# Patient Record
Sex: FEMALE | Race: WHITE | Employment: OTHER | URBAN - METROPOLITAN AREA
[De-identification: names, ages, dates, MRNs, and addresses within clinical notes are randomized per-mention and may not be internally consistent; named-entity substitution may affect disease eponyms.]

---

## 2017-12-14 ENCOUNTER — OFFICE VISIT (OUTPATIENT)
Dept: INTERNAL MEDICINE CLINIC | Age: 77
End: 2017-12-14
Payer: COMMERCIAL

## 2017-12-14 VITALS
RESPIRATION RATE: 21 BRPM | HEART RATE: 82 BPM | BODY MASS INDEX: 34.59 KG/M2 | OXYGEN SATURATION: 96 % | HEIGHT: 64 IN | DIASTOLIC BLOOD PRESSURE: 80 MMHG | WEIGHT: 202.6 LBS | SYSTOLIC BLOOD PRESSURE: 122 MMHG

## 2017-12-14 DIAGNOSIS — K21.9 GASTROESOPHAGEAL REFLUX DISEASE WITHOUT ESOPHAGITIS: ICD-10-CM

## 2017-12-14 DIAGNOSIS — M41.20 SCOLIOSIS (AND KYPHOSCOLIOSIS), IDIOPATHIC: ICD-10-CM

## 2017-12-14 DIAGNOSIS — M15.9 GENERALIZED OA: ICD-10-CM

## 2017-12-14 DIAGNOSIS — Z78.0 POST-MENOPAUSAL: ICD-10-CM

## 2017-12-14 DIAGNOSIS — R04.0 EPISTAXIS: ICD-10-CM

## 2017-12-14 DIAGNOSIS — I48.20 CHRONIC ATRIAL FIBRILLATION (HCC): ICD-10-CM

## 2017-12-14 DIAGNOSIS — I25.10 CORONARY ARTERY DISEASE INVOLVING NATIVE CORONARY ARTERY OF NATIVE HEART WITHOUT ANGINA PECTORIS: ICD-10-CM

## 2017-12-14 DIAGNOSIS — I10 ESSENTIAL HYPERTENSION: Primary | ICD-10-CM

## 2017-12-14 DIAGNOSIS — M85.80 OSTEOPENIA, UNSPECIFIED LOCATION: ICD-10-CM

## 2017-12-14 DIAGNOSIS — E78.49 OTHER HYPERLIPIDEMIA: ICD-10-CM

## 2017-12-14 DIAGNOSIS — D68.9 COAGULOPATHY (HCC): ICD-10-CM

## 2017-12-14 PROCEDURE — 99204 OFFICE O/P NEW MOD 45 MIN: CPT | Performed by: FAMILY MEDICINE

## 2017-12-14 PROCEDURE — 1036F TOBACCO NON-USER: CPT | Performed by: FAMILY MEDICINE

## 2017-12-14 PROCEDURE — G8427 DOCREV CUR MEDS BY ELIG CLIN: HCPCS | Performed by: FAMILY MEDICINE

## 2017-12-14 PROCEDURE — 1123F ACP DISCUSS/DSCN MKR DOCD: CPT | Performed by: FAMILY MEDICINE

## 2017-12-14 PROCEDURE — G8400 PT W/DXA NO RESULTS DOC: HCPCS | Performed by: FAMILY MEDICINE

## 2017-12-14 PROCEDURE — G8417 CALC BMI ABV UP PARAM F/U: HCPCS | Performed by: FAMILY MEDICINE

## 2017-12-14 PROCEDURE — 4040F PNEUMOC VAC/ADMIN/RCVD: CPT | Performed by: FAMILY MEDICINE

## 2017-12-14 PROCEDURE — 1090F PRES/ABSN URINE INCON ASSESS: CPT | Performed by: FAMILY MEDICINE

## 2017-12-14 PROCEDURE — G8484 FLU IMMUNIZE NO ADMIN: HCPCS | Performed by: FAMILY MEDICINE

## 2017-12-14 PROCEDURE — G8598 ASA/ANTIPLAT THER USED: HCPCS | Performed by: FAMILY MEDICINE

## 2017-12-14 RX ORDER — APIXABAN 2.5 MG/1
TABLET, FILM COATED ORAL
COMMUNITY
Start: 2017-09-22 | End: 2018-03-05 | Stop reason: SDUPTHER

## 2017-12-14 RX ORDER — OMEPRAZOLE 40 MG/1
CAPSULE, DELAYED RELEASE ORAL
COMMUNITY
Start: 2017-11-01 | End: 2018-01-26 | Stop reason: ALTCHOICE

## 2017-12-14 RX ORDER — INDAPAMIDE 1.25 MG/1
TABLET, FILM COATED ORAL
COMMUNITY
Start: 2017-12-08

## 2017-12-14 ASSESSMENT — ENCOUNTER SYMPTOMS
GASTROINTESTINAL NEGATIVE: 1
RESPIRATORY NEGATIVE: 1
ALLERGIC/IMMUNOLOGIC NEGATIVE: 1
EYES NEGATIVE: 1

## 2017-12-14 ASSESSMENT — PATIENT HEALTH QUESTIONNAIRE - PHQ9
SUM OF ALL RESPONSES TO PHQ9 QUESTIONS 1 & 2: 0
1. LITTLE INTEREST OR PLEASURE IN DOING THINGS: 0
2. FEELING DOWN, DEPRESSED OR HOPELESS: 0
SUM OF ALL RESPONSES TO PHQ QUESTIONS 1-9: 0

## 2017-12-14 NOTE — PROGRESS NOTES
stable on proton pump inhibitor. Kyphoscoliosis. No apparent acute cardiopulmonary decompensation. Continue activity as tolerated. Osteopenia on calcium and vitamin D.  Med list reviewed advised to continue  Patient appears anxious with clinical possibility of underlying depression. She denies suicidality. She denies tobacco, excessive alcohol or illicit drug use  Patient state that she recently had some blood work done at [facility]. Advised to furnish us with a copy. She denies tobacco, excessive alcohol or illicit drug use  Safety counseling including but not limited to carbon monoxide/fire alarm, ample lighting, avoiding loose clutter and staying in familiar environment. She is advised to update her pneumonia and influenza vaccination. Further recommendations to follow labs  Call for any concern  This note is created with a voice recognition program and while intend to generate a document that accurately reflects the content of the visit, no guarantee can be provided that every mistake has been identified and corrected by editing.

## 2017-12-28 ENCOUNTER — HOSPITAL ENCOUNTER (OUTPATIENT)
Facility: CLINIC | Age: 77
Discharge: HOME | End: 2017-12-28
Payer: COMMERCIAL

## 2017-12-28 DIAGNOSIS — I10 ESSENTIAL HYPERTENSION: ICD-10-CM

## 2017-12-28 DIAGNOSIS — I48.20 CHRONIC ATRIAL FIBRILLATION (HCC): ICD-10-CM

## 2017-12-28 DIAGNOSIS — D68.9 COAGULOPATHY (HCC): ICD-10-CM

## 2017-12-28 LAB
ABSOLUTE EOS #: 0.11 K/UL (ref 0–0.44)
ABSOLUTE IMMATURE GRANULOCYTE: <0.03 K/UL (ref 0–0.3)
ABSOLUTE LYMPH #: 1.64 K/UL (ref 1.1–3.7)
ABSOLUTE MONO #: 0.36 K/UL (ref 0.1–1.2)
ALBUMIN SERPL-MCNC: 3.8 G/DL (ref 3.5–5.2)
ALBUMIN/GLOBULIN RATIO: 1 (ref 1–2.5)
ALP BLD-CCNC: 79 U/L (ref 35–104)
ALT SERPL-CCNC: 22 U/L (ref 5–33)
ANION GAP SERPL CALCULATED.3IONS-SCNC: 10 MMOL/L (ref 9–17)
AST SERPL-CCNC: 26 U/L
BASOPHILS # BLD: 0 % (ref 0–2)
BASOPHILS ABSOLUTE: <0.03 K/UL (ref 0–0.2)
BILIRUB SERPL-MCNC: 0.67 MG/DL (ref 0.3–1.2)
BUN BLDV-MCNC: 19 MG/DL (ref 8–23)
BUN/CREAT BLD: ABNORMAL (ref 9–20)
CALCIUM SERPL-MCNC: 9.6 MG/DL (ref 8.6–10.4)
CHLORIDE BLD-SCNC: 98 MMOL/L (ref 98–107)
CHOLESTEROL/HDL RATIO: 3.8
CHOLESTEROL: 234 MG/DL
CO2: 28 MMOL/L (ref 20–31)
CREAT SERPL-MCNC: 0.76 MG/DL (ref 0.5–0.9)
DIFFERENTIAL TYPE: ABNORMAL
EOSINOPHILS RELATIVE PERCENT: 2 % (ref 1–4)
ESTIMATED AVERAGE GLUCOSE: 131 MG/DL
GFR AFRICAN AMERICAN: >60 ML/MIN
GFR NON-AFRICAN AMERICAN: >60 ML/MIN
GFR SERPL CREATININE-BSD FRML MDRD: ABNORMAL ML/MIN/{1.73_M2}
GFR SERPL CREATININE-BSD FRML MDRD: ABNORMAL ML/MIN/{1.73_M2}
GLUCOSE BLD-MCNC: 117 MG/DL (ref 70–99)
HBA1C MFR BLD: 6.2 % (ref 4–6)
HCT VFR BLD CALC: 51.2 % (ref 36.3–47.1)
HDLC SERPL-MCNC: 61 MG/DL
HEMOGLOBIN: 16.2 G/DL (ref 11.9–15.1)
HEPATITIS C ANTIBODY: ABNORMAL
HIV AG/AB: NONREACTIVE
IMMATURE GRANULOCYTES: 0 %
LDL CHOLESTEROL: 128 MG/DL (ref 0–130)
LYMPHOCYTES # BLD: 36 % (ref 24–43)
MCH RBC QN AUTO: 30 PG (ref 25.2–33.5)
MCHC RBC AUTO-ENTMCNC: 31.6 G/DL (ref 28.4–34.8)
MCV RBC AUTO: 94.8 FL (ref 82.6–102.9)
MONOCYTES # BLD: 8 % (ref 3–12)
PDW BLD-RTO: 14.6 % (ref 11.8–14.4)
PLATELET # BLD: 208 K/UL (ref 138–453)
PLATELET ESTIMATE: ABNORMAL
PMV BLD AUTO: 10.4 FL (ref 8.1–13.5)
POTASSIUM SERPL-SCNC: 4.6 MMOL/L (ref 3.7–5.3)
RBC # BLD: 5.4 M/UL (ref 3.95–5.11)
RBC # BLD: ABNORMAL 10*6/UL
SEG NEUTROPHILS: 54 % (ref 36–65)
SEGMENTED NEUTROPHILS ABSOLUTE COUNT: 2.38 K/UL (ref 1.5–8.1)
SODIUM BLD-SCNC: 136 MMOL/L (ref 135–144)
TOTAL PROTEIN: 7.6 G/DL (ref 6.4–8.3)
TRIGL SERPL-MCNC: 225 MG/DL
TSH SERPL DL<=0.05 MIU/L-ACNC: 2.17 MIU/L (ref 0.3–5)
VLDLC SERPL CALC-MCNC: ABNORMAL MG/DL (ref 1–30)
WBC # BLD: 4.5 K/UL (ref 3.5–11.3)
WBC # BLD: ABNORMAL 10*3/UL

## 2017-12-28 PROCEDURE — 36415 COLL VENOUS BLD VENIPUNCTURE: CPT

## 2017-12-28 PROCEDURE — 80061 LIPID PANEL: CPT

## 2017-12-28 PROCEDURE — 87389 HIV-1 AG W/HIV-1&-2 AB AG IA: CPT

## 2017-12-28 PROCEDURE — 80053 COMPREHEN METABOLIC PANEL: CPT

## 2017-12-28 PROCEDURE — 86803 HEPATITIS C AB TEST: CPT

## 2017-12-28 PROCEDURE — 85025 COMPLETE CBC W/AUTO DIFF WBC: CPT

## 2017-12-28 PROCEDURE — 84443 ASSAY THYROID STIM HORMONE: CPT

## 2017-12-28 PROCEDURE — 83036 HEMOGLOBIN GLYCOSYLATED A1C: CPT

## 2018-01-22 RX ORDER — AMLODIPINE BESYLATE 5 MG/1
TABLET ORAL
Qty: 90 TABLET | Refills: 1 | Status: SHIPPED | OUTPATIENT
Start: 2018-01-22 | End: 2018-07-27 | Stop reason: SDUPTHER

## 2018-02-05 ENCOUNTER — TELEPHONE (OUTPATIENT)
Dept: INTERNAL MEDICINE CLINIC | Age: 78
End: 2018-02-05

## 2018-02-05 RX ORDER — OMEPRAZOLE 10 MG/1
10 CAPSULE, DELAYED RELEASE ORAL DAILY
Qty: 90 CAPSULE | Refills: 2 | Status: SHIPPED | OUTPATIENT
Start: 2018-02-05 | End: 2018-02-05 | Stop reason: DRUGHIGH

## 2018-02-05 RX ORDER — OMEPRAZOLE 40 MG/1
40 CAPSULE, DELAYED RELEASE ORAL DAILY
Qty: 90 CAPSULE | Refills: 3 | Status: SHIPPED | OUTPATIENT
Start: 2018-02-05 | End: 2018-02-06 | Stop reason: SDUPTHER

## 2018-02-06 RX ORDER — OMEPRAZOLE 40 MG/1
40 CAPSULE, DELAYED RELEASE ORAL DAILY
Qty: 90 CAPSULE | Refills: 3 | Status: SHIPPED | OUTPATIENT
Start: 2018-02-06

## 2018-03-05 RX ORDER — APIXABAN 2.5 MG/1
TABLET, FILM COATED ORAL
Qty: 60 TABLET | Refills: 0 | Status: SHIPPED | OUTPATIENT
Start: 2018-03-05 | End: 2018-04-05 | Stop reason: SDUPTHER

## 2018-04-05 RX ORDER — APIXABAN 2.5 MG/1
TABLET, FILM COATED ORAL
Qty: 60 TABLET | Refills: 1 | Status: SHIPPED | OUTPATIENT
Start: 2018-04-05 | End: 2018-06-04 | Stop reason: SDUPTHER

## 2018-04-19 ENCOUNTER — OFFICE VISIT (OUTPATIENT)
Dept: INTERNAL MEDICINE CLINIC | Age: 78
End: 2018-04-19
Payer: MEDICARE

## 2018-04-19 VITALS
SYSTOLIC BLOOD PRESSURE: 110 MMHG | HEART RATE: 78 BPM | HEIGHT: 64 IN | DIASTOLIC BLOOD PRESSURE: 80 MMHG | RESPIRATION RATE: 17 BRPM | OXYGEN SATURATION: 97 % | WEIGHT: 207 LBS | BODY MASS INDEX: 35.34 KG/M2

## 2018-04-19 DIAGNOSIS — R73.03 PRE-DIABETES: ICD-10-CM

## 2018-04-19 DIAGNOSIS — E78.49 OTHER HYPERLIPIDEMIA: ICD-10-CM

## 2018-04-19 DIAGNOSIS — I10 ESSENTIAL HYPERTENSION: Primary | ICD-10-CM

## 2018-04-19 DIAGNOSIS — I48.20 CHRONIC ATRIAL FIBRILLATION (HCC): ICD-10-CM

## 2018-04-19 DIAGNOSIS — D68.9 COAGULOPATHY (HCC): ICD-10-CM

## 2018-04-19 DIAGNOSIS — G47.30 SLEEP APNEA, UNSPECIFIED TYPE: ICD-10-CM

## 2018-04-19 DIAGNOSIS — D75.1 SECONDARY POLYCYTHEMIA: ICD-10-CM

## 2018-04-19 DIAGNOSIS — I25.10 CORONARY ARTERY DISEASE INVOLVING NATIVE CORONARY ARTERY OF NATIVE HEART WITHOUT ANGINA PECTORIS: ICD-10-CM

## 2018-04-19 DIAGNOSIS — E88.81 METABOLIC SYNDROME: ICD-10-CM

## 2018-04-19 PROCEDURE — G8417 CALC BMI ABV UP PARAM F/U: HCPCS | Performed by: FAMILY MEDICINE

## 2018-04-19 PROCEDURE — 4040F PNEUMOC VAC/ADMIN/RCVD: CPT | Performed by: FAMILY MEDICINE

## 2018-04-19 PROCEDURE — 1036F TOBACCO NON-USER: CPT | Performed by: FAMILY MEDICINE

## 2018-04-19 PROCEDURE — 1123F ACP DISCUSS/DSCN MKR DOCD: CPT | Performed by: FAMILY MEDICINE

## 2018-04-19 PROCEDURE — 99214 OFFICE O/P EST MOD 30 MIN: CPT | Performed by: FAMILY MEDICINE

## 2018-04-19 PROCEDURE — G8598 ASA/ANTIPLAT THER USED: HCPCS | Performed by: FAMILY MEDICINE

## 2018-04-19 PROCEDURE — 1090F PRES/ABSN URINE INCON ASSESS: CPT | Performed by: FAMILY MEDICINE

## 2018-04-19 PROCEDURE — G8400 PT W/DXA NO RESULTS DOC: HCPCS | Performed by: FAMILY MEDICINE

## 2018-04-19 PROCEDURE — G8427 DOCREV CUR MEDS BY ELIG CLIN: HCPCS | Performed by: FAMILY MEDICINE

## 2018-04-19 RX ORDER — DIAZEPAM 5 MG/1
TABLET ORAL
Refills: 0 | COMMUNITY
Start: 2018-03-12 | End: 2019-07-25 | Stop reason: ALTCHOICE

## 2018-04-19 RX ORDER — LISINOPRIL 40 MG/1
40 TABLET ORAL DAILY
Qty: 90 TABLET | Refills: 1 | Status: SHIPPED | OUTPATIENT
Start: 2018-04-19 | End: 2018-10-25 | Stop reason: SDUPTHER

## 2018-04-19 RX ORDER — ATORVASTATIN CALCIUM 10 MG/1
10 TABLET, FILM COATED ORAL DAILY
Qty: 90 TABLET | Refills: 1 | Status: SHIPPED | OUTPATIENT
Start: 2018-04-19

## 2018-04-19 ASSESSMENT — PATIENT HEALTH QUESTIONNAIRE - PHQ9
SUM OF ALL RESPONSES TO PHQ9 QUESTIONS 1 & 2: 0
SUM OF ALL RESPONSES TO PHQ QUESTIONS 1-9: 0
2. FEELING DOWN, DEPRESSED OR HOPELESS: 0
1. LITTLE INTEREST OR PLEASURE IN DOING THINGS: 0

## 2018-04-19 ASSESSMENT — ENCOUNTER SYMPTOMS
EYES NEGATIVE: 1
ALLERGIC/IMMUNOLOGIC NEGATIVE: 1
GASTROINTESTINAL NEGATIVE: 1
RESPIRATORY NEGATIVE: 1

## 2018-07-30 RX ORDER — AMLODIPINE BESYLATE 5 MG/1
TABLET ORAL
Qty: 90 TABLET | Refills: 0 | Status: SHIPPED | OUTPATIENT
Start: 2018-07-30 | End: 2018-10-30 | Stop reason: SDUPTHER

## 2018-10-25 RX ORDER — LISINOPRIL 40 MG/1
40 TABLET ORAL DAILY
Qty: 30 TABLET | Refills: 0 | Status: SHIPPED | OUTPATIENT
Start: 2018-10-25

## 2019-03-25 ENCOUNTER — OFFICE VISIT (OUTPATIENT)
Dept: INTERNAL MEDICINE CLINIC | Age: 79
End: 2019-03-25
Payer: MEDICARE

## 2019-03-25 ENCOUNTER — CARE COORDINATION (OUTPATIENT)
Dept: CARE COORDINATION | Age: 79
End: 2019-03-25

## 2019-03-25 VITALS
HEART RATE: 79 BPM | BODY MASS INDEX: 36.16 KG/M2 | WEIGHT: 211.8 LBS | DIASTOLIC BLOOD PRESSURE: 82 MMHG | SYSTOLIC BLOOD PRESSURE: 135 MMHG | HEIGHT: 64 IN

## 2019-03-25 DIAGNOSIS — M54.12 CERVICAL RADICULOPATHY: ICD-10-CM

## 2019-03-25 DIAGNOSIS — E78.49 OTHER HYPERLIPIDEMIA: ICD-10-CM

## 2019-03-25 DIAGNOSIS — I10 ESSENTIAL HYPERTENSION: Primary | ICD-10-CM

## 2019-03-25 DIAGNOSIS — Z59.89 INSURANCE COVERAGE PROBLEMS: ICD-10-CM

## 2019-03-25 DIAGNOSIS — M47.819 MULTILEVEL SPONDYLOSIS: ICD-10-CM

## 2019-03-25 DIAGNOSIS — I25.10 CORONARY ARTERY DISEASE INVOLVING NATIVE CORONARY ARTERY OF NATIVE HEART WITHOUT ANGINA PECTORIS: ICD-10-CM

## 2019-03-25 DIAGNOSIS — I48.20 CHRONIC ATRIAL FIBRILLATION (HCC): ICD-10-CM

## 2019-03-25 DIAGNOSIS — D68.9 COAGULOPATHY (HCC): ICD-10-CM

## 2019-03-25 DIAGNOSIS — R73.03 PRE-DIABETES: ICD-10-CM

## 2019-03-25 DIAGNOSIS — Z91.199 MEDICALLY NONCOMPLIANT: ICD-10-CM

## 2019-03-25 PROBLEM — Z59.71 INSURANCE COVERAGE PROBLEMS: Status: ACTIVE | Noted: 2019-03-25

## 2019-03-25 PROCEDURE — G8427 DOCREV CUR MEDS BY ELIG CLIN: HCPCS | Performed by: FAMILY MEDICINE

## 2019-03-25 PROCEDURE — 1101F PT FALLS ASSESS-DOCD LE1/YR: CPT | Performed by: FAMILY MEDICINE

## 2019-03-25 PROCEDURE — 1036F TOBACCO NON-USER: CPT | Performed by: FAMILY MEDICINE

## 2019-03-25 PROCEDURE — G8400 PT W/DXA NO RESULTS DOC: HCPCS | Performed by: FAMILY MEDICINE

## 2019-03-25 PROCEDURE — 1123F ACP DISCUSS/DSCN MKR DOCD: CPT | Performed by: FAMILY MEDICINE

## 2019-03-25 PROCEDURE — G8482 FLU IMMUNIZE ORDER/ADMIN: HCPCS | Performed by: FAMILY MEDICINE

## 2019-03-25 PROCEDURE — 4040F PNEUMOC VAC/ADMIN/RCVD: CPT | Performed by: FAMILY MEDICINE

## 2019-03-25 PROCEDURE — 1090F PRES/ABSN URINE INCON ASSESS: CPT | Performed by: FAMILY MEDICINE

## 2019-03-25 PROCEDURE — G8598 ASA/ANTIPLAT THER USED: HCPCS | Performed by: FAMILY MEDICINE

## 2019-03-25 PROCEDURE — G8417 CALC BMI ABV UP PARAM F/U: HCPCS | Performed by: FAMILY MEDICINE

## 2019-03-25 PROCEDURE — 99214 OFFICE O/P EST MOD 30 MIN: CPT | Performed by: FAMILY MEDICINE

## 2019-03-25 RX ORDER — TRAMADOL HYDROCHLORIDE 50 MG/1
TABLET ORAL
Refills: 0 | COMMUNITY
Start: 2019-02-14

## 2019-03-25 SDOH — ECONOMIC STABILITY - INCOME SECURITY: OTHER PROBLEMS RELATED TO HOUSING AND ECONOMIC CIRCUMSTANCES: Z59.89

## 2019-03-25 ASSESSMENT — PATIENT HEALTH QUESTIONNAIRE - PHQ9
SUM OF ALL RESPONSES TO PHQ QUESTIONS 1-9: 0
1. LITTLE INTEREST OR PLEASURE IN DOING THINGS: 0
SUM OF ALL RESPONSES TO PHQ QUESTIONS 1-9: 0
2. FEELING DOWN, DEPRESSED OR HOPELESS: 0
SUM OF ALL RESPONSES TO PHQ9 QUESTIONS 1 & 2: 0

## 2019-03-25 ASSESSMENT — ENCOUNTER SYMPTOMS
BACK PAIN: 1
RESPIRATORY NEGATIVE: 1
EYES NEGATIVE: 1
ALLERGIC/IMMUNOLOGIC NEGATIVE: 1
GASTROINTESTINAL NEGATIVE: 1

## 2019-04-01 ENCOUNTER — HOSPITAL ENCOUNTER (OUTPATIENT)
Facility: CLINIC | Age: 79
Discharge: HOME | End: 2019-04-01
Payer: MEDICARE

## 2019-04-01 DIAGNOSIS — R73.03 PRE-DIABETES: ICD-10-CM

## 2019-04-01 DIAGNOSIS — I10 ESSENTIAL HYPERTENSION: ICD-10-CM

## 2019-04-01 DIAGNOSIS — I25.10 CORONARY ARTERY DISEASE INVOLVING NATIVE CORONARY ARTERY OF NATIVE HEART WITHOUT ANGINA PECTORIS: ICD-10-CM

## 2019-04-01 LAB
ALBUMIN SERPL-MCNC: 4 G/DL (ref 3.5–5.2)
ALBUMIN/GLOBULIN RATIO: 1 (ref 1–2.5)
ALP BLD-CCNC: 76 U/L (ref 35–104)
ALT SERPL-CCNC: 20 U/L (ref 5–33)
ANION GAP SERPL CALCULATED.3IONS-SCNC: 18 MMOL/L (ref 9–17)
AST SERPL-CCNC: 23 U/L
BILIRUB SERPL-MCNC: 0.48 MG/DL (ref 0.3–1.2)
BUN BLDV-MCNC: 15 MG/DL (ref 8–23)
BUN/CREAT BLD: ABNORMAL (ref 9–20)
CALCIUM SERPL-MCNC: 9.8 MG/DL (ref 8.6–10.4)
CHLORIDE BLD-SCNC: 104 MMOL/L (ref 98–107)
CHOLESTEROL/HDL RATIO: 3.9
CHOLESTEROL: 177 MG/DL
CO2: 22 MMOL/L (ref 20–31)
CREAT SERPL-MCNC: 0.62 MG/DL (ref 0.5–0.9)
CREATININE URINE: 92.4 MG/DL (ref 28–217)
ESTIMATED AVERAGE GLUCOSE: 137 MG/DL
GFR AFRICAN AMERICAN: >60 ML/MIN
GFR NON-AFRICAN AMERICAN: >60 ML/MIN
GFR SERPL CREATININE-BSD FRML MDRD: ABNORMAL ML/MIN/{1.73_M2}
GFR SERPL CREATININE-BSD FRML MDRD: ABNORMAL ML/MIN/{1.73_M2}
GLUCOSE BLD-MCNC: 123 MG/DL (ref 70–99)
HBA1C MFR BLD: 6.4 % (ref 4–6)
HCT VFR BLD CALC: 48.9 % (ref 36.3–47.1)
HDLC SERPL-MCNC: 45 MG/DL
HEMOGLOBIN: 15.5 G/DL (ref 11.9–15.1)
LDL CHOLESTEROL: 90 MG/DL (ref 0–130)
MCH RBC QN AUTO: 28.3 PG (ref 25.2–33.5)
MCHC RBC AUTO-ENTMCNC: 31.7 G/DL (ref 28.4–34.8)
MCV RBC AUTO: 89.4 FL (ref 82.6–102.9)
MICROALBUMIN/CREAT 24H UR: 37 MG/L
MICROALBUMIN/CREAT UR-RTO: 40 MCG/MG CREAT
NRBC AUTOMATED: 0 PER 100 WBC
PDW BLD-RTO: 17.2 % (ref 11.8–14.4)
PLATELET # BLD: 208 K/UL (ref 138–453)
PMV BLD AUTO: 10.5 FL (ref 8.1–13.5)
POTASSIUM SERPL-SCNC: 4.1 MMOL/L (ref 3.7–5.3)
RBC # BLD: 5.47 M/UL (ref 3.95–5.11)
SODIUM BLD-SCNC: 144 MMOL/L (ref 135–144)
TOTAL PROTEIN: 7.9 G/DL (ref 6.4–8.3)
TRIGL SERPL-MCNC: 209 MG/DL
TSH SERPL DL<=0.05 MIU/L-ACNC: 2.08 MIU/L (ref 0.3–5)
VLDLC SERPL CALC-MCNC: ABNORMAL MG/DL (ref 1–30)
WBC # BLD: 5.2 K/UL (ref 3.5–11.3)

## 2019-04-01 PROCEDURE — 82570 ASSAY OF URINE CREATININE: CPT

## 2019-04-01 PROCEDURE — 85027 COMPLETE CBC AUTOMATED: CPT

## 2019-04-01 PROCEDURE — 80053 COMPREHEN METABOLIC PANEL: CPT

## 2019-04-01 PROCEDURE — 82043 UR ALBUMIN QUANTITATIVE: CPT

## 2019-04-01 PROCEDURE — 83036 HEMOGLOBIN GLYCOSYLATED A1C: CPT

## 2019-04-01 PROCEDURE — 36415 COLL VENOUS BLD VENIPUNCTURE: CPT

## 2019-04-01 PROCEDURE — 84443 ASSAY THYROID STIM HORMONE: CPT

## 2019-04-01 PROCEDURE — 80061 LIPID PANEL: CPT

## 2019-07-25 ENCOUNTER — OFFICE VISIT (OUTPATIENT)
Dept: INTERNAL MEDICINE CLINIC | Age: 79
End: 2019-07-25
Payer: MEDICARE

## 2019-07-25 VITALS
DIASTOLIC BLOOD PRESSURE: 80 MMHG | SYSTOLIC BLOOD PRESSURE: 130 MMHG | HEART RATE: 92 BPM | BODY MASS INDEX: 35.85 KG/M2 | WEIGHT: 210 LBS | RESPIRATION RATE: 24 BRPM | OXYGEN SATURATION: 94 % | HEIGHT: 64 IN

## 2019-07-25 DIAGNOSIS — I48.20 CHRONIC ATRIAL FIBRILLATION (HCC): ICD-10-CM

## 2019-07-25 DIAGNOSIS — R41.3 MEMORY DIFFICULTIES: Primary | ICD-10-CM

## 2019-07-25 DIAGNOSIS — I10 ESSENTIAL HYPERTENSION: ICD-10-CM

## 2019-07-25 DIAGNOSIS — T45.515A WARFARIN-INDUCED COAGULOPATHY (HCC): ICD-10-CM

## 2019-07-25 DIAGNOSIS — M54.12 CERVICAL RADICULOPATHY: ICD-10-CM

## 2019-07-25 DIAGNOSIS — M47.819 MULTILEVEL SPONDYLOSIS: ICD-10-CM

## 2019-07-25 DIAGNOSIS — E78.49 OTHER HYPERLIPIDEMIA: ICD-10-CM

## 2019-07-25 DIAGNOSIS — I25.10 CORONARY ARTERY DISEASE INVOLVING NATIVE CORONARY ARTERY OF NATIVE HEART WITHOUT ANGINA PECTORIS: ICD-10-CM

## 2019-07-25 DIAGNOSIS — R73.03 PRE-DIABETES: ICD-10-CM

## 2019-07-25 DIAGNOSIS — D68.32 WARFARIN-INDUCED COAGULOPATHY (HCC): ICD-10-CM

## 2019-07-25 PROBLEM — Z59.89 INSURANCE COVERAGE PROBLEMS: Status: RESOLVED | Noted: 2019-03-25 | Resolved: 2019-07-25

## 2019-07-25 PROBLEM — Z59.71 INSURANCE COVERAGE PROBLEMS: Status: RESOLVED | Noted: 2019-03-25 | Resolved: 2019-07-25

## 2019-07-25 PROBLEM — Z91.199 MEDICALLY NONCOMPLIANT: Status: RESOLVED | Noted: 2019-03-25 | Resolved: 2019-07-25

## 2019-07-25 PROCEDURE — G8417 CALC BMI ABV UP PARAM F/U: HCPCS | Performed by: FAMILY MEDICINE

## 2019-07-25 PROCEDURE — G8598 ASA/ANTIPLAT THER USED: HCPCS | Performed by: FAMILY MEDICINE

## 2019-07-25 PROCEDURE — G8427 DOCREV CUR MEDS BY ELIG CLIN: HCPCS | Performed by: FAMILY MEDICINE

## 2019-07-25 PROCEDURE — G8400 PT W/DXA NO RESULTS DOC: HCPCS | Performed by: FAMILY MEDICINE

## 2019-07-25 PROCEDURE — 1090F PRES/ABSN URINE INCON ASSESS: CPT | Performed by: FAMILY MEDICINE

## 2019-07-25 PROCEDURE — 1123F ACP DISCUSS/DSCN MKR DOCD: CPT | Performed by: FAMILY MEDICINE

## 2019-07-25 PROCEDURE — 99214 OFFICE O/P EST MOD 30 MIN: CPT | Performed by: FAMILY MEDICINE

## 2019-07-25 PROCEDURE — 4040F PNEUMOC VAC/ADMIN/RCVD: CPT | Performed by: FAMILY MEDICINE

## 2019-07-25 PROCEDURE — 1036F TOBACCO NON-USER: CPT | Performed by: FAMILY MEDICINE

## 2019-07-25 RX ORDER — WARFARIN SODIUM 2.5 MG/1
TABLET ORAL
Refills: 3 | COMMUNITY
Start: 2019-07-11

## 2019-07-25 ASSESSMENT — ENCOUNTER SYMPTOMS
ALLERGIC/IMMUNOLOGIC NEGATIVE: 1
RESPIRATORY NEGATIVE: 1
EYES NEGATIVE: 1
GASTROINTESTINAL NEGATIVE: 1

## 2019-07-25 NOTE — LETTER
[facility]  [address]  Phone: [phone]  Fax: [phone]    Tyree Henson MD        July 25, 2019     Patient: Nils Osler   YOB: 1940   Date of Visit: 7/25/2019       To Whom It May Concern: It is my medical opinion that Kingsley Morris requires a disability parking placard for the following reasons:  She cannot walk 200 feet without stopping to rest.  Duration of need: permanent    If you have any questions or concerns, please don't hesitate to call.     Sincerely,        Tyree Henson MD

## 2019-07-25 NOTE — PROGRESS NOTES
cardiology. No recent decompensation. Coagulopathy on Coumadin titrated by Coumadin clinic. She is counseled on Coumadin compatible diet  Prediabetes with A1c of 6.4 which is progressive/modest worsening. Risk factor stratification is advised. She would benefit from low-fat high-fiber diet, daily moderate exercise and lifestyle change. Hyperlipidemia and statin that she is tolerating well  Med list reviewed advised to continue  She denies tobacco, excessive alcohol or illicit drug use  She is encouraged to call for any concern  This note is created with a voice recognition program and while intend to generate a document that accurately reflects the content of the visit, no guarantee can be provided that every mistake has been identified and corrected by editing.           Ema Herbert MD

## 2019-09-03 ENCOUNTER — TELEPHONE (OUTPATIENT)
Dept: INTERNAL MEDICINE CLINIC | Age: 79
End: 2019-09-03

## 2019-10-03 ENCOUNTER — OFFICE VISIT (OUTPATIENT)
Dept: INTERNAL MEDICINE CLINIC | Age: 79
End: 2019-10-03
Payer: MEDICARE

## 2019-10-03 VITALS
HEART RATE: 64 BPM | SYSTOLIC BLOOD PRESSURE: 110 MMHG | HEIGHT: 64 IN | RESPIRATION RATE: 16 BRPM | OXYGEN SATURATION: 94 % | BODY MASS INDEX: 34.86 KG/M2 | WEIGHT: 204.2 LBS | DIASTOLIC BLOOD PRESSURE: 84 MMHG

## 2019-10-03 DIAGNOSIS — R73.03 PRE-DIABETES: ICD-10-CM

## 2019-10-03 DIAGNOSIS — T45.515A WARFARIN-INDUCED COAGULOPATHY (HCC): ICD-10-CM

## 2019-10-03 DIAGNOSIS — D68.9 COAGULOPATHY (HCC): ICD-10-CM

## 2019-10-03 DIAGNOSIS — E78.49 OTHER HYPERLIPIDEMIA: ICD-10-CM

## 2019-10-03 DIAGNOSIS — B34.9 ACUTE VIRAL SYNDROME: Primary | ICD-10-CM

## 2019-10-03 DIAGNOSIS — D68.32 WARFARIN-INDUCED COAGULOPATHY (HCC): ICD-10-CM

## 2019-10-03 DIAGNOSIS — I10 ESSENTIAL HYPERTENSION: ICD-10-CM

## 2019-10-03 DIAGNOSIS — M47.819 MULTILEVEL SPONDYLOSIS: ICD-10-CM

## 2019-10-03 DIAGNOSIS — M19.049 HAND ARTHRITIS: ICD-10-CM

## 2019-10-03 DIAGNOSIS — I25.10 CORONARY ARTERY DISEASE INVOLVING NATIVE CORONARY ARTERY OF NATIVE HEART WITHOUT ANGINA PECTORIS: ICD-10-CM

## 2019-10-03 DIAGNOSIS — I48.20 CHRONIC ATRIAL FIBRILLATION (HCC): ICD-10-CM

## 2019-10-03 DIAGNOSIS — R41.3 MEMORY DIFFICULTIES: ICD-10-CM

## 2019-10-03 DIAGNOSIS — M54.12 CERVICAL RADICULOPATHY: ICD-10-CM

## 2019-10-03 PROCEDURE — 1036F TOBACCO NON-USER: CPT | Performed by: FAMILY MEDICINE

## 2019-10-03 PROCEDURE — 1090F PRES/ABSN URINE INCON ASSESS: CPT | Performed by: FAMILY MEDICINE

## 2019-10-03 PROCEDURE — 1123F ACP DISCUSS/DSCN MKR DOCD: CPT | Performed by: FAMILY MEDICINE

## 2019-10-03 PROCEDURE — G8598 ASA/ANTIPLAT THER USED: HCPCS | Performed by: FAMILY MEDICINE

## 2019-10-03 PROCEDURE — G8484 FLU IMMUNIZE NO ADMIN: HCPCS | Performed by: FAMILY MEDICINE

## 2019-10-03 PROCEDURE — 4040F PNEUMOC VAC/ADMIN/RCVD: CPT | Performed by: FAMILY MEDICINE

## 2019-10-03 PROCEDURE — G8417 CALC BMI ABV UP PARAM F/U: HCPCS | Performed by: FAMILY MEDICINE

## 2019-10-03 PROCEDURE — 99214 OFFICE O/P EST MOD 30 MIN: CPT | Performed by: FAMILY MEDICINE

## 2019-10-03 PROCEDURE — G8400 PT W/DXA NO RESULTS DOC: HCPCS | Performed by: FAMILY MEDICINE

## 2019-10-03 PROCEDURE — G8427 DOCREV CUR MEDS BY ELIG CLIN: HCPCS | Performed by: FAMILY MEDICINE

## 2019-10-03 RX ORDER — ALLOPURINOL 100 MG/1
TABLET ORAL 2 TIMES DAILY
Refills: 11 | COMMUNITY
Start: 2019-09-10

## 2019-10-03 RX ORDER — AZITHROMYCIN 250 MG/1
250 TABLET, FILM COATED ORAL SEE ADMIN INSTRUCTIONS
Qty: 6 TABLET | Refills: 0 | Status: SHIPPED | OUTPATIENT
Start: 2019-10-03 | End: 2019-10-08

## 2019-10-03 ASSESSMENT — ENCOUNTER SYMPTOMS
COUGH: 1
BACK PAIN: 1
EYES NEGATIVE: 1
SORE THROAT: 1
GASTROINTESTINAL NEGATIVE: 1

## 2019-10-11 ENCOUNTER — HOSPITAL ENCOUNTER (OUTPATIENT)
Dept: OCCUPATIONAL THERAPY | Age: 79
Setting detail: THERAPIES SERIES
Discharge: HOME | End: 2019-10-11
Payer: MEDICARE

## 2019-10-11 PROCEDURE — 97110 THERAPEUTIC EXERCISES: CPT

## 2019-10-11 PROCEDURE — 97165 OT EVAL LOW COMPLEX 30 MIN: CPT

## 2019-10-15 ENCOUNTER — HOSPITAL ENCOUNTER (OUTPATIENT)
Dept: OCCUPATIONAL THERAPY | Age: 79
Setting detail: THERAPIES SERIES
Discharge: HOME | End: 2019-10-15
Payer: MEDICARE

## 2019-10-15 PROCEDURE — 97110 THERAPEUTIC EXERCISES: CPT

## 2019-10-25 ENCOUNTER — HOSPITAL ENCOUNTER (OUTPATIENT)
Dept: OCCUPATIONAL THERAPY | Age: 79
Setting detail: THERAPIES SERIES
Discharge: HOME | End: 2019-10-25
Payer: MEDICARE

## 2019-10-25 PROCEDURE — 97110 THERAPEUTIC EXERCISES: CPT

## 2019-11-01 ENCOUNTER — HOSPITAL ENCOUNTER (OUTPATIENT)
Dept: OCCUPATIONAL THERAPY | Age: 79
Setting detail: THERAPIES SERIES
Discharge: HOME | End: 2019-11-01
Payer: MEDICARE

## 2019-11-01 PROCEDURE — 97110 THERAPEUTIC EXERCISES: CPT

## 2019-11-05 ENCOUNTER — HOSPITAL ENCOUNTER (OUTPATIENT)
Dept: OCCUPATIONAL THERAPY | Age: 79
Setting detail: THERAPIES SERIES
Discharge: HOME | End: 2019-11-05
Payer: MEDICARE

## 2019-11-05 PROCEDURE — 97110 THERAPEUTIC EXERCISES: CPT

## 2019-11-08 ENCOUNTER — HOSPITAL ENCOUNTER (OUTPATIENT)
Dept: OCCUPATIONAL THERAPY | Age: 79
Setting detail: THERAPIES SERIES
Discharge: HOME | End: 2019-11-08
Payer: MEDICARE

## 2019-11-08 PROCEDURE — 97110 THERAPEUTIC EXERCISES: CPT

## 2019-11-15 ENCOUNTER — HOSPITAL ENCOUNTER (OUTPATIENT)
Dept: OCCUPATIONAL THERAPY | Age: 79
Setting detail: THERAPIES SERIES
Discharge: HOME | End: 2019-11-15
Payer: MEDICARE

## 2019-11-15 PROCEDURE — 97110 THERAPEUTIC EXERCISES: CPT

## 2019-11-19 ENCOUNTER — HOSPITAL ENCOUNTER (OUTPATIENT)
Dept: OCCUPATIONAL THERAPY | Age: 79
Setting detail: THERAPIES SERIES
Discharge: HOME | End: 2019-11-19
Payer: MEDICARE

## 2019-11-19 PROCEDURE — 97110 THERAPEUTIC EXERCISES: CPT

## 2019-11-22 ENCOUNTER — APPOINTMENT (OUTPATIENT)
Dept: OCCUPATIONAL THERAPY | Age: 79
End: 2019-11-22
Payer: MEDICARE

## 2019-11-26 ENCOUNTER — APPOINTMENT (OUTPATIENT)
Dept: OCCUPATIONAL THERAPY | Age: 79
End: 2019-11-26
Payer: MEDICARE

## 2019-12-04 ENCOUNTER — OFFICE VISIT (OUTPATIENT)
Dept: NEUROLOGY | Age: 79
End: 2019-12-04
Payer: MEDICARE

## 2019-12-04 VITALS
SYSTOLIC BLOOD PRESSURE: 139 MMHG | DIASTOLIC BLOOD PRESSURE: 88 MMHG | HEIGHT: 64 IN | BODY MASS INDEX: 35.07 KG/M2 | HEART RATE: 98 BPM | WEIGHT: 205.4 LBS

## 2019-12-04 DIAGNOSIS — R29.898 WEAKNESS OF BOTH HANDS: ICD-10-CM

## 2019-12-04 DIAGNOSIS — R41.3 MEMORY LOSS: Primary | ICD-10-CM

## 2019-12-04 PROCEDURE — G8598 ASA/ANTIPLAT THER USED: HCPCS | Performed by: PSYCHIATRY & NEUROLOGY

## 2019-12-04 PROCEDURE — 99204 OFFICE O/P NEW MOD 45 MIN: CPT | Performed by: PSYCHIATRY & NEUROLOGY

## 2019-12-04 PROCEDURE — G8400 PT W/DXA NO RESULTS DOC: HCPCS | Performed by: PSYCHIATRY & NEUROLOGY

## 2019-12-04 PROCEDURE — 1123F ACP DISCUSS/DSCN MKR DOCD: CPT | Performed by: PSYCHIATRY & NEUROLOGY

## 2019-12-04 PROCEDURE — 1036F TOBACCO NON-USER: CPT | Performed by: PSYCHIATRY & NEUROLOGY

## 2019-12-04 PROCEDURE — G8484 FLU IMMUNIZE NO ADMIN: HCPCS | Performed by: PSYCHIATRY & NEUROLOGY

## 2019-12-04 PROCEDURE — 4040F PNEUMOC VAC/ADMIN/RCVD: CPT | Performed by: PSYCHIATRY & NEUROLOGY

## 2019-12-04 PROCEDURE — 1090F PRES/ABSN URINE INCON ASSESS: CPT | Performed by: PSYCHIATRY & NEUROLOGY

## 2019-12-04 PROCEDURE — G8417 CALC BMI ABV UP PARAM F/U: HCPCS | Performed by: PSYCHIATRY & NEUROLOGY

## 2019-12-04 PROCEDURE — G8427 DOCREV CUR MEDS BY ELIG CLIN: HCPCS | Performed by: PSYCHIATRY & NEUROLOGY

## 2019-12-04 RX ORDER — THIAMINE HCL 100 MG
TABLET ORAL
COMMUNITY

## 2019-12-05 ENCOUNTER — TELEPHONE (OUTPATIENT)
Dept: NEUROLOGY | Age: 79
End: 2019-12-05

## 2019-12-05 DIAGNOSIS — F40.240 CLAUSTROPHOBIA: Primary | ICD-10-CM

## 2019-12-11 RX ORDER — LORAZEPAM 0.5 MG/1
0.5 TABLET ORAL ONCE
Qty: 1 TABLET | Refills: 0 | Status: SHIPPED | OUTPATIENT
Start: 2019-12-11 | End: 2019-12-11

## 2019-12-17 ENCOUNTER — HOSPITAL ENCOUNTER (OUTPATIENT)
Dept: MRI IMAGING | Age: 79
Discharge: HOME | End: 2019-12-19
Payer: MEDICARE

## 2019-12-17 DIAGNOSIS — R29.898 WEAKNESS OF BOTH HANDS: ICD-10-CM

## 2019-12-17 PROCEDURE — 72141 MRI NECK SPINE W/O DYE: CPT

## 2020-03-09 ENCOUNTER — HOSPITAL ENCOUNTER (OUTPATIENT)
Facility: CLINIC | Age: 80
Discharge: HOME | End: 2020-03-09
Payer: MEDICARE

## 2020-03-09 LAB
ALT SERPL-CCNC: 16 U/L (ref 5–33)
AST SERPL-CCNC: 22 U/L
URIC ACID: 6.7 MG/DL (ref 2.4–5.7)

## 2020-03-09 PROCEDURE — 84460 ALANINE AMINO (ALT) (SGPT): CPT

## 2020-03-09 PROCEDURE — 36415 COLL VENOUS BLD VENIPUNCTURE: CPT

## 2020-03-09 PROCEDURE — 84450 TRANSFERASE (AST) (SGOT): CPT

## 2020-03-09 PROCEDURE — 84550 ASSAY OF BLOOD/URIC ACID: CPT

## 2020-05-08 NOTE — PROGRESS NOTES
bulk. Strength at least 4+/5 on all 4 extremities, no pronator drift      Sensory function Unable to test      Cerebellar Intact fine motor movement. No involuntary movements or tremors. No ataxia or dysmetria on finger to nose or heel to shin testing      Reflex function Unable to test      Gait                   normal base and arm swing              ASSESSMENT AND PLAN:       This is a 78 y.o. female who comes in for follow up for nonprogressive memory loss. Her cognitive testing and work-up came back normal, and she has no impairment of her ADLs. Reassured patient that there is no evidence of a primary degenerative process such as dementia. We will continue to monitor her clinically. 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 We will do a trial of bilateral wrist splints at night, and see how she responds. Follow-up in 3 months. Patient ID verified by me prior to start of this visit    This is a telehealth visit that was performed with the originating site at Patient Location: home and Provider Location of [place], [place]. Verbal consent to participate in video visit was obtained. Pursuant to the emergency declaration under the [facility], [facility] waiver authority and the [employer] Act, this Virtual Visit was conducted, with patient's consent, to reduce the patient's risk of exposure to COVID-19 and provide continuity of care for an established/new patient. Services were provided through a video synchronous discussion virtually to substitute for in-person clinic visit.  I discussed with the patient the nature of our telehealth visits via interactive/real-time audio/video that:  - I would evaluate the patient and recommend diagnostics and treatments based on my assessment  - Our sessions are not being recorded and that personal health information is

## 2020-05-12 ENCOUNTER — TELEMEDICINE (OUTPATIENT)
Dept: NEUROLOGY | Age: 80
End: 2020-05-12
Payer: MEDICARE

## 2020-05-12 PROCEDURE — G8428 CUR MEDS NOT DOCUMENT: HCPCS | Performed by: PSYCHIATRY & NEUROLOGY

## 2020-05-12 PROCEDURE — 99214 OFFICE O/P EST MOD 30 MIN: CPT | Performed by: PSYCHIATRY & NEUROLOGY

## 2020-05-12 PROCEDURE — 1123F ACP DISCUSS/DSCN MKR DOCD: CPT | Performed by: PSYCHIATRY & NEUROLOGY

## 2020-05-12 PROCEDURE — G8400 PT W/DXA NO RESULTS DOC: HCPCS | Performed by: PSYCHIATRY & NEUROLOGY

## 2020-05-12 PROCEDURE — 1090F PRES/ABSN URINE INCON ASSESS: CPT | Performed by: PSYCHIATRY & NEUROLOGY

## 2020-05-12 PROCEDURE — 4040F PNEUMOC VAC/ADMIN/RCVD: CPT | Performed by: PSYCHIATRY & NEUROLOGY

## 2020-07-01 ENCOUNTER — TELEPHONE (OUTPATIENT)
Dept: FAMILY MEDICINE CLINIC | Age: 80
End: 2020-07-01

## 2020-07-01 NOTE — TELEPHONE ENCOUNTER
Keyshawn Hutchinson was contacted to set up an annual wellness visit    Spoke with: left message to schedule awv Nickie Holter

## 2020-07-13 ENCOUNTER — TELEPHONE (OUTPATIENT)
Dept: INTERNAL MEDICINE CLINIC | Age: 80
End: 2020-07-13

## 2021-06-10 ENCOUNTER — TELEPHONE (OUTPATIENT)
Dept: INTERNAL MEDICINE CLINIC | Age: 81
End: 2021-06-10

## 2021-06-10 ENCOUNTER — HOSPITAL ENCOUNTER (EMERGENCY)
Facility: CLINIC | Age: 81
Discharge: HOME | End: 2021-06-10
Attending: EMERGENCY MEDICINE
Payer: MEDICARE

## 2021-06-10 VITALS
WEIGHT: 205 LBS | DIASTOLIC BLOOD PRESSURE: 98 MMHG | TEMPERATURE: 98.1 F | SYSTOLIC BLOOD PRESSURE: 144 MMHG | HEART RATE: 99 BPM | HEIGHT: 64 IN | BODY MASS INDEX: 35 KG/M2 | RESPIRATION RATE: 18 BRPM | OXYGEN SATURATION: 95 %

## 2021-06-10 DIAGNOSIS — N30.00 ACUTE CYSTITIS WITHOUT HEMATURIA: Primary | ICD-10-CM

## 2021-06-10 LAB
-: ABNORMAL
AMORPHOUS: ABNORMAL
BACTERIA: ABNORMAL
BILIRUBIN URINE: NEGATIVE
CASTS UA: ABNORMAL /LPF (ref 0–2)
COLOR: YELLOW
COMMENT UA: ABNORMAL
CRYSTALS, UA: ABNORMAL /HPF
EPITHELIAL CELLS UA: ABNORMAL /HPF (ref 0–5)
GLUCOSE URINE: NEGATIVE
KETONES, URINE: NEGATIVE
LEUKOCYTE ESTERASE, URINE: ABNORMAL
MUCUS: ABNORMAL
NITRITE, URINE: NEGATIVE
OTHER OBSERVATIONS UA: ABNORMAL
PH UA: 7 (ref 5–8)
PROTEIN UA: NEGATIVE
RBC UA: ABNORMAL /HPF (ref 0–2)
RENAL EPITHELIAL, UA: ABNORMAL /HPF
SPECIFIC GRAVITY UA: 1.01 (ref 1–1.03)
TRICHOMONAS: ABNORMAL
TURBIDITY: ABNORMAL
URINE HGB: ABNORMAL
UROBILINOGEN, URINE: NORMAL
WBC UA: ABNORMAL /HPF (ref 0–5)
YEAST: ABNORMAL

## 2021-06-10 PROCEDURE — 81001 URINALYSIS AUTO W/SCOPE: CPT

## 2021-06-10 PROCEDURE — 6370000000 HC RX 637 (ALT 250 FOR IP): Performed by: EMERGENCY MEDICINE

## 2021-06-10 PROCEDURE — 87186 SC STD MICRODIL/AGAR DIL: CPT

## 2021-06-10 PROCEDURE — 87086 URINE CULTURE/COLONY COUNT: CPT

## 2021-06-10 PROCEDURE — 99283 EMERGENCY DEPT VISIT LOW MDM: CPT

## 2021-06-10 PROCEDURE — 87088 URINE BACTERIA CULTURE: CPT

## 2021-06-10 RX ORDER — CEPHALEXIN 250 MG/1
500 CAPSULE ORAL ONCE
Status: COMPLETED | OUTPATIENT
Start: 2021-06-10 | End: 2021-06-10

## 2021-06-10 RX ORDER — SPIRONOLACTONE 100 MG/1
100 TABLET, FILM COATED ORAL DAILY
COMMUNITY

## 2021-06-10 RX ORDER — CEPHALEXIN 500 MG/1
500 CAPSULE ORAL 2 TIMES DAILY
Qty: 14 CAPSULE | Refills: 0 | Status: SHIPPED | OUTPATIENT
Start: 2021-06-10 | End: 2021-06-17

## 2021-06-10 RX ADMIN — CEPHALEXIN 500 MG: 250 CAPSULE ORAL at 17:26

## 2021-06-10 NOTE — ED PROVIDER NOTES
[facility]  [address] [facility]  Phone: [phone]        Pt Name: Yang Wen  MRN: [id number]  [place] 1940  Date of evaluation: 6/10/21      CHIEF COMPLAINT     Chief Complaint   Patient presents with    Urinary Tract Infection     started last night    Dysuria         HISTORY OF PRESENT ILLNESS  (Location/Symptom, Timing/Onset, Context/Setting, Quality, Duration, Modifying Factors, Severity.)    Yang Wen is a 78 y.o. female who presents planing of UTI symptoms. The patient states that she has been having dysuria that has been coming and going for the last 1 day. She also reports increased frequency and urgency. Patient does not have a long history of getting frequent UTIs. In fact, she thinks this is only the third 1 she has had in her lifetime. She denies any vaginal bleeding or discharge. No abdominal pain or back pain. No nausea or vomiting. No fever or chills. REVIEW OF SYSTEMS    (2-9 systems for level 4, 10 or more for level 5)     Constitutional: no fever, chills, fatigue  HENT: No headache, nasal congestion, sore throat, hearing changes, ear pain or discharge  Eyes: no visual changes or photophobia  Respiratory: no cough, shortness of breath, or wheezing  Cardiovascular: no chest pain, palpitations, or leg swelling  Abdominal: no abdominal pain, nausea, vomiting, diarrhea, or constipation  Genitourinary: + dysuria, frequency, and urgency  Musculoskeletal: no arthralgias, myalgias, neck or back pain  Skin: no rash or wound  Neurological: no numbness, tingling or weakness  Hematologic:  no history of easy bleeding or bruising            PAST MEDICAL HISTORY    has a past medical history of Arrhythmia, Arthritis, Atrial fibrillation ([address] COPD (chronic obstructive pulmonary disease) ([facility] [address] Diverticulitis, Gastric ulcer, Hyperlipidemia, Hypertension, and Sleep apnea.     SURGICAL HISTORY      has a past surgical history that includes partial hysterectomy (cervix not removed); Varicocele repair; bladder repair; Tonsillectomy; and Appendectomy. CURRENTMEDICATIONS       Previous Medications    ALLOPURINOL (ZYLOPRIM) 100 MG TABLET    2 times daily     AMLODIPINE (NORVASC) 5 MG TABLET    TAKE ONE TABLET BY MOUTH ONCE A DAY    ATENOLOL (TENORMIN) 50 MG TABLET    Take 1 tablet by mouth daily. ATORVASTATIN (LIPITOR) 10 MG TABLET    Take 1 tablet by mouth daily    CALCIUM CARBONATE (CALCIUM 600 PO)    Take by mouth    CHOLECALCIFEROL (VITAMIN D3 PO)    Take by mouth    FLAXSEED, LINSEED, (FLAX PO)    Take by mouth    INDAPAMIDE (LOZOL) 1.25 MG TABLET        LISINOPRIL (PRINIVIL;ZESTRIL) 40 MG TABLET    Take 1 tablet by mouth daily    MAGNESIUM 500 MG TABS    Take by mouth    MULTIPLE VITAMIN PO    Take by mouth    OMEPRAZOLE (PRILOSEC) 40 MG DELAYED RELEASE CAPSULE    Take 1 capsule by mouth daily    ONDANSETRON (ZOFRAN ODT) 4 MG DISINTEGRATING TABLET    Take 1 tablet by mouth every 8 hours as needed for Nausea    POTASSIUM 99 MG TABS    Take by mouth    SPIRONOLACTONE (ALDACTONE) 100 MG TABLET    Take 100 mg by mouth daily    TRAMADOL (ULTRAM) 50 MG TABLET    TAKE 1 TABLET BY MOUTH EVERY 6 HOURS AS NEEDED WITH FOOD- NOT TO EXCEED 4 DAYS    WARFARIN (COUMADIN) 2.5 MG TABLET    dose adjusted       ALLERGIES     is allergic to asa [aspirin], hydrochlorothiazide, naproxen, nsaids, and tape [adhesive tape]. FAMILY HISTORY     has no family status information on file. family history is not on file. SOCIAL HISTORY      reports that she quit smoking about 49 years ago. Her smoking use included cigarettes. She started smoking about 67 years ago. She has a 18.00 pack-year smoking history. She has never used smokeless tobacco. She reports that she does not drink alcohol and does not use drugs. PHYSICAL EXAM    (up to 7 for level 4, 8 or more for level 5)   INITIAL VITALS:  height is 5' 4\" (1.626 m) and weight is 93 kg (205 lb).  Her oral temperature is 98.1 °F (36.7 °C). Her blood pressure is 144/98 (abnormal) and her pulse is 99. Her respiration is 18 and oxygen saturation is 95%. Physical Exam  Vitals and nursing note reviewed. Constitutional:       Appearance: Normal appearance. HENT:      Nose: Nose normal.      Mouth/Throat:      Mouth: Mucous membranes are moist.      Pharynx: Oropharynx is clear. Eyes:      Extraocular Movements: Extraocular movements intact. Pupils: Pupils are equal, round, and reactive to light. Cardiovascular:      Rate and Rhythm: Normal rate and regular rhythm. Pulses: Normal pulses. Heart sounds: Normal heart sounds. No murmur heard. No friction rub. No gallop. Pulmonary:      Effort: Pulmonary effort is normal.      Breath sounds: Normal breath sounds. No wheezing, rhonchi or rales. Abdominal:      General: Abdomen is flat. Bowel sounds are normal.      Palpations: Abdomen is soft. Tenderness: There is no abdominal tenderness. There is no right CVA tenderness, left CVA tenderness, guarding or rebound. Musculoskeletal:         General: Normal range of motion. Right lower leg: No edema. Left lower leg: No edema. Skin:     General: Skin is warm and dry. Capillary Refill: Capillary refill takes less than 2 seconds. Neurological:      Mental Status: She is alert and oriented to person, place, and time. Mental status is at baseline. Psychiatric:         Mood and Affect: Mood normal.         Behavior: Behavior normal.         Thought Content: Thought content normal.         DIFFERENTIAL DIAGNOSIS/ MDM:     69-year-old female here with UTI symptoms. She does not appear systemically ill. She is quite well-appearing, in fact.   Urinalysis shows    DIAGNOSTIC RESULTS     EKG: All EKG's are interpreted by the Emergency Department Physician who either signs or Co-signs this chart in the absence of a cardiologist.    none    RADIOLOGY:        Interpretation per the Radiologist below, Hoda Handler  [address] [place]  [id number]  [phone]            DISCHARGE MEDICATIONS:  New Prescriptions    CEPHALEXIN (KEFLEX) 500 MG CAPSULE    Take 1 capsule by mouth 2 times daily for 7 days       (Please note that portions of this note were completed with a voicerecognition program.  Efforts were made to edit the dictations but occasionally words are mis-transcribed.)    Nita Bowens MD  Attending Emergency Medicine Physician        Nita Bowens MD  06/10/21 [id number]

## 2021-06-10 NOTE — TELEPHONE ENCOUNTER
----- Message from Luis Maher sent at 6/10/2021  2:04 PM EDT -----  Subject: Appointment Request    Reason for Call: Urgent Adult Urinary Problem    QUESTIONS  Type of Appointment? Established Patient  Reason for appointment request? No appointments available during search  Additional Information for Provider? Patient feels has a uti infection and   wouldlike to come in soon. x two days urgency , discomfort wants to get in   before weekend. cb pt carpal tunnel flaring up in both hands  ---------------------------------------------------------------------------  --------------  CALL BACK INFO  What is the best way for the office to contact you? OK to leave message on   voicemail  Preferred Call Back Phone Number? [phone]  ---------------------------------------------------------------------------  --------------  SCRIPT ANSWERS  Relationship to Patient? Self  Appointment reason? Symptomatic  Select script based on patient symptoms? Adult Urinary Symptoms   [Urine-related, UTI, Bladder Infection]   Are you having severe back pain with your urinary symptoms? No  Are you having vomiting or nausea? No  Is there blood in your urine? No  Are you having fevers (100.4), chills, or sweats? No  Have you been seen by a provider for this symptom before? No  Have you been diagnosed with, awaiting test results for, or told that you   are suspected of having COVID-19 (Coronavirus)? (If patient has tested   negative or was tested as a requirement for work, school, or travel and   not based on symptoms, answer no)? No  Do you currently have flu-like symptoms including fever or chills, cough,   shortness of breath, difficulty breathing, or new loss of taste or smell? No  Have you had close contact with someone with COVID-19 in the last 14 days? No  (Service Expert  click yes below to proceed with [employer] As Usual   Scheduling)?  Yes

## 2021-06-12 LAB
CULTURE: ABNORMAL
Lab: ABNORMAL
SPECIMEN DESCRIPTION: ABNORMAL

## 2022-03-31 ENCOUNTER — HOSPITAL ENCOUNTER (EMERGENCY)
Facility: CLINIC | Age: 82
Discharge: HOME | End: 2022-03-31
Attending: EMERGENCY MEDICINE
Payer: MEDICARE

## 2022-03-31 ENCOUNTER — APPOINTMENT (OUTPATIENT)
Dept: GENERAL RADIOLOGY | Facility: CLINIC | Age: 82
End: 2022-03-31
Payer: MEDICARE

## 2022-03-31 VITALS
TEMPERATURE: 97.7 F | HEART RATE: 77 BPM | WEIGHT: 220 LBS | BODY MASS INDEX: 37.76 KG/M2 | DIASTOLIC BLOOD PRESSURE: 90 MMHG | RESPIRATION RATE: 13 BRPM | OXYGEN SATURATION: 92 % | SYSTOLIC BLOOD PRESSURE: 134 MMHG

## 2022-03-31 DIAGNOSIS — R00.2 PALPITATIONS: Primary | ICD-10-CM

## 2022-03-31 DIAGNOSIS — E87.70 HYPERVOLEMIA, UNSPECIFIED HYPERVOLEMIA TYPE: ICD-10-CM

## 2022-03-31 LAB
ANION GAP SERPL CALCULATED.3IONS-SCNC: 11 MMOL/L (ref 9–17)
BUN BLDV-MCNC: 16 MG/DL (ref 8–23)
CALCIUM SERPL-MCNC: 9.4 MG/DL (ref 8.6–10.4)
CHLORIDE BLD-SCNC: 104 MMOL/L (ref 98–107)
CO2: 24 MMOL/L (ref 20–31)
CREAT SERPL-MCNC: 0.7 MG/DL (ref 0.5–0.9)
D-DIMER QUANTITATIVE: 0.55 MG/L FEU
EKG ATRIAL RATE: 86 BPM
EKG Q-T INTERVAL: 324 MS
EKG QRS DURATION: 142 MS
EKG QTC CALCULATION (BAZETT): 428 MS
EKG R AXIS: -55 DEGREES
EKG T AXIS: 114 DEGREES
EKG VENTRICULAR RATE: 105 BPM
GFR AFRICAN AMERICAN: >60 ML/MIN
GFR NON-AFRICAN AMERICAN: >60 ML/MIN
GFR SERPL CREATININE-BSD FRML MDRD: ABNORMAL ML/MIN/{1.73_M2}
GLUCOSE BLD-MCNC: 118 MG/DL (ref 70–99)
HCT VFR BLD CALC: 52.9 % (ref 36–46)
HEMOGLOBIN: 17.3 G/DL (ref 12–16)
INR BLD: 2.1
MAGNESIUM: 1.9 MG/DL (ref 1.6–2.6)
MCH RBC QN AUTO: 31.6 PG (ref 26–34)
MCHC RBC AUTO-ENTMCNC: 32.7 G/DL (ref 31–37)
MCV RBC AUTO: 96.6 FL (ref 80–100)
PARTIAL THROMBOPLASTIN TIME: 29.7 SEC (ref 21.3–31.3)
PDW BLD-RTO: 15 % (ref 12.5–15.4)
PLATELET # BLD: 193 K/UL (ref 140–450)
PMV BLD AUTO: 8.3 FL (ref 6–12)
POTASSIUM SERPL-SCNC: 4.7 MMOL/L (ref 3.7–5.3)
PRO-BNP: 1159 PG/ML
PROTHROMBIN TIME: 21.2 SEC (ref 9.4–12.6)
RBC # BLD: 5.48 M/UL (ref 4–5.2)
SODIUM BLD-SCNC: 139 MMOL/L (ref 135–144)
TROPONIN, HIGH SENSITIVITY: 10 NG/L (ref 0–14)
TROPONIN, HIGH SENSITIVITY: 9 NG/L (ref 0–14)
WBC # BLD: 5.2 K/UL (ref 3.5–11)

## 2022-03-31 PROCEDURE — 85379 FIBRIN DEGRADATION QUANT: CPT

## 2022-03-31 PROCEDURE — 83735 ASSAY OF MAGNESIUM: CPT

## 2022-03-31 PROCEDURE — 84484 ASSAY OF TROPONIN QUANT: CPT

## 2022-03-31 PROCEDURE — 2500000003 HC RX 250 WO HCPCS: Performed by: EMERGENCY MEDICINE

## 2022-03-31 PROCEDURE — 85610 PROTHROMBIN TIME: CPT

## 2022-03-31 PROCEDURE — 6360000002 HC RX W HCPCS: Performed by: EMERGENCY MEDICINE

## 2022-03-31 PROCEDURE — 99285 EMERGENCY DEPT VISIT HI MDM: CPT

## 2022-03-31 PROCEDURE — 96374 THER/PROPH/DIAG INJ IV PUSH: CPT

## 2022-03-31 PROCEDURE — 71045 X-RAY EXAM CHEST 1 VIEW: CPT

## 2022-03-31 PROCEDURE — 85027 COMPLETE CBC AUTOMATED: CPT

## 2022-03-31 PROCEDURE — 83880 ASSAY OF NATRIURETIC PEPTIDE: CPT

## 2022-03-31 PROCEDURE — 93005 ELECTROCARDIOGRAM TRACING: CPT | Performed by: EMERGENCY MEDICINE

## 2022-03-31 PROCEDURE — 36415 COLL VENOUS BLD VENIPUNCTURE: CPT

## 2022-03-31 PROCEDURE — 85730 THROMBOPLASTIN TIME PARTIAL: CPT

## 2022-03-31 PROCEDURE — 80048 BASIC METABOLIC PNL TOTAL CA: CPT

## 2022-03-31 PROCEDURE — 96375 TX/PRO/DX INJ NEW DRUG ADDON: CPT

## 2022-03-31 RX ORDER — FUROSEMIDE 10 MG/ML
40 INJECTION INTRAMUSCULAR; INTRAVENOUS ONCE
Status: COMPLETED | OUTPATIENT
Start: 2022-03-31 | End: 2022-03-31

## 2022-03-31 RX ORDER — DILTIAZEM HYDROCHLORIDE 5 MG/ML
15 INJECTION INTRAVENOUS ONCE
Status: COMPLETED | OUTPATIENT
Start: 2022-03-31 | End: 2022-03-31

## 2022-03-31 RX ADMIN — FUROSEMIDE 40 MG: 10 INJECTION, SOLUTION INTRAMUSCULAR; INTRAVENOUS at 15:04

## 2022-03-31 RX ADMIN — DILTIAZEM HYDROCHLORIDE 15 MG: 5 INJECTION INTRAVENOUS at 13:58

## 2022-03-31 ASSESSMENT — ENCOUNTER SYMPTOMS
SORE THROAT: 0
VOMITING: 0
DIARRHEA: 0
SHORTNESS OF BREATH: 1

## 2022-03-31 NOTE — ED NOTES
Pt given [place]  [place] ([facility]) sandwich / chips and cookie - 100 ml water      Warren Ziegler RN  03/31/22 5525

## 2022-03-31 NOTE — ED PROVIDER NOTES
[facility]  [facility]  Phone: [phone]        [facility] ED  EMERGENCY DEPARTMENT ENCOUNTER      Pt Name: Aga Carmichael  MRN: [id number]  Jacintogfcam 1940  Date of evaluation: 3/31/2022  Provider: Lora Hoover DO    CHIEF COMPLAINT       Chief Complaint   Patient presents with    Chest Pain         HISTORY OF PRESENT ILLNESS   (Location/Symptom, Timing/Onset,Context/Setting, Quality, Duration, Modifying Factors, Severity)  Note limiting factors. Aga Carmichael is a 78 y.o. female who presents to the emergency department for evaluation of palpitations in her chest and shortness of breath. Patient states is been going on for about 2 days and she feels like she is having worsening shortness of breath especially with even minimal exertion. No cough. No fever. No recent travel, slight swelling in her legs bilaterally without calf pain. No history of blood clots, she is on Coumadin for her atrial fibrillation. Patient states she did not have any chest pain with her palpitations. Patient states she has an appointment with her cardiologist tomorrow to have her INR checked. Nursing Notes were reviewed. REVIEW OF SYSTEMS    (2-9systems for level 4, 10 or more for level 5)     Review of Systems   Constitutional: Negative for fever. HENT: Negative for sore throat. Respiratory: Positive for shortness of breath. Cardiovascular: Positive for palpitations. Negative for chest pain. Gastrointestinal: Negative for diarrhea and vomiting. Genitourinary: Negative for dysuria. Skin: Negative for rash. Neurological: Negative for weakness. All other systems reviewed and are negative. Except asnoted above the remainder of the review of systems was reviewed and negative.        PAST MEDICAL HISTORY     Past Medical History:   Diagnosis Date    Arrhythmia     Arthritis     Atrial fibrillation ([facility]     COPD [facility]chronic obstructive pulmonary disease) ([facility]     Diverticulitis     Gastric ulcer     Hyperlipidemia     Hypertension     Sleep apnea          SURGICAL HISTORY       Past Surgical History:   Procedure Laterality Date    APPENDECTOMY      BLADDER REPAIR      PARTIAL HYSTERECTOMY      TONSILLECTOMY      VARIOCOCELE REPAIR           CURRENT MEDICATIONS     Previous Medications    ALLOPURINOL (ZYLOPRIM) 100 MG TABLET    2 times daily     AMLODIPINE (NORVASC) 5 MG TABLET    TAKE ONE TABLET BY MOUTH ONCE A DAY    ATENOLOL (TENORMIN) 50 MG TABLET    Take 1 tablet by mouth daily. ATORVASTATIN (LIPITOR) 10 MG TABLET    Take 1 tablet by mouth daily    CALCIUM CARBONATE (CALCIUM 600 PO)    Take by mouth    CHOLECALCIFEROL (VITAMIN D3 PO)    Take by mouth    FLAXSEED, LINSEED, (FLAX PO)    Take by mouth    INDAPAMIDE (LOZOL) 1.25 MG TABLET        LISINOPRIL (PRINIVIL;ZESTRIL) 40 MG TABLET    Take 1 tablet by mouth daily    MAGNESIUM 500 MG TABS    Take by mouth    MULTIPLE VITAMIN PO    Take by mouth    OMEPRAZOLE (PRILOSEC) 40 MG DELAYED RELEASE CAPSULE    Take 1 capsule by mouth daily    ONDANSETRON (ZOFRAN ODT) 4 MG DISINTEGRATING TABLET    Take 1 tablet by mouth every 8 hours as needed for Nausea    POTASSIUM 99 MG TABS    Take by mouth    SPIRONOLACTONE (ALDACTONE) 100 MG TABLET    Take 100 mg by mouth daily    TRAMADOL (ULTRAM) 50 MG TABLET    TAKE 1 TABLET BY MOUTH EVERY 6 HOURS AS NEEDED WITH FOOD- NOT TO EXCEED 4 DAYS    WARFARIN (COUMADIN) 2.5 MG TABLET    dose adjusted       ALLERGIES     Asa [aspirin], Hydrochlorothiazide, Naproxen, Nsaids, and Tape [adhesive tape]    FAMILY HISTORY     No family history on file.        SOCIAL HISTORY       Social History     Socioeconomic History    Marital status:      Spouse name: Not on file    Number of children: Not on file    Years of education: Not on file    Highest education level: Not on file   Occupational History    Not on file   Tobacco Use    Smoking status: Former Smoker Packs/day: 1.00     Years: 18.00     Pack years: 18.00     Types: Cigarettes     Start date: 10     Quit date: 0     Years since quittin.2    Smokeless tobacco: Never Used   Vaping Use    Vaping Use: Never used   Substance and Sexual Activity    Alcohol use: No    Drug use: No    Sexual activity: Not on file   Other Topics Concern    Not on file   Social History Narrative    Not on file     Social Determinants of Health     Financial Resource Strain:     Difficulty of Paying Living Expenses: Not on file   Food Insecurity:     Worried About Running Out of Food in the Last Year: Not on file    Laurie of Food in the Last Year: Not on file   Transportation Needs:     Lack of Transportation (Medical): Not on file    Lack of Transportation (Non-Medical):  Not on file   Physical Activity:     Days of Exercise per Week: Not on file    Minutes of Exercise per Session: Not on file   Stress:     Feeling of Stress : Not on file   Social Connections:     Frequency of Communication with Friends and Family: Not on file    Frequency of Social Gatherings with Friends and Family: Not on file    Attends Sabianism Services: Not on file    Active Member of [address] [place] [address] or Organizations: Not on file    Attends Club or Organization Meetings: Not on file    Marital Status: Not on file   Intimate Partner Violence:     Fear of Current or Ex-Partner: Not on file    Emotionally Abused: Not on file    Physically Abused: Not on file    Sexually Abused: Not on file   Housing Stability:     Unable to Pay for Housing in the Last Year: Not on file    Number of [place] in the Last Year: Not on file    Unstable Housing in the Last Year: Not on file       SCREENINGS    Starbuck Coma Scale  Eye Opening: Spontaneous (pt is hard of hearing / pt has hearing aids)  Best Verbal Response: Oriented  Best Motor Response: Obeys commands  Starbuck Coma Scale Score: 15        PHYSICAL EXAM    (up to 7 for level 4, 8 or more for level 5)     ED Triage Vitals   BP Temp Temp src Pulse Resp SpO2 Height Weight   -- -- -- -- -- -- -- --       Physical Exam  Vitals and nursing note reviewed. Constitutional:       General: She is not in acute distress. Appearance: Normal appearance. She is not ill-appearing or toxic-appearing. HENT:      Head: Normocephalic and atraumatic. Nose: Nose normal. No congestion. Mouth/Throat:      Mouth: Mucous membranes are moist.   Eyes:      General:         Right eye: No discharge. Left eye: No discharge. Conjunctiva/sclera: Conjunctivae normal.   Cardiovascular:      Rate and Rhythm: Normal rate and regular rhythm. Pulses: Normal pulses. Heart sounds: Normal heart sounds. No murmur heard. Pulmonary:      Effort: Pulmonary effort is normal. No respiratory distress. Breath sounds: Normal breath sounds. No wheezing. Abdominal:      General: Abdomen is flat. There is no distension. Palpations: Abdomen is soft. Tenderness: There is no abdominal tenderness. Musculoskeletal:         General: No deformity or signs of injury. Normal range of motion. Cervical back: Normal range of motion. Skin:     General: Skin is warm and dry. Capillary Refill: Capillary refill takes less than 2 seconds. Findings: No rash. Neurological:      General: No focal deficit present. Mental Status: She is alert. Mental status is at baseline. Motor: No weakness. Comments: Speaking normally. No facial asymmetry. Moving all 4 extremities. Normal gait.     Psychiatric:         Mood and Affect: Mood normal.         EMERGENCY DEPARTMENT COURSE and DIFFERENTIAL DIAGNOSIS/MDM:   Vitals:    Vitals:    03/31/22 1430 03/31/22 1440 03/31/22 1503 03/31/22 1613   BP: (!) 121/91 (!) 109/95 (!) 144/80 (!) 134/90   Pulse: 63 75 77    Resp: 22 18 13    Temp:       SpO2: 92% 95% 94% 92%   Weight:           Patient presents to the emergency department with a complaint described above. Vital signs show hypertension, otherwise unremarkable. She is not tachycardic. She denies actually having chest pain, she states she does have palpitations and this made her feel short of breath. I do think it is a product of her atrial fibrillation but I am getting twelve-lead EKG and routine blood work including cardiac enzymes and will get a D-dimer. DIAGNOSTIC RESULTS     LABS:  Labs Reviewed   CBC - Abnormal; Notable for the following components:       Result Value    RBC 5.48 (*)     Hemoglobin 17.3 (*)     Hematocrit 52.9 (*)     All other components within normal limits   BASIC METABOLIC PANEL - Abnormal; Notable for the following components:    Glucose 118 (*)     All other components within normal limits   PROTIME-INR - Abnormal; Notable for the following components:    Protime 21.2 (*)     All other components within normal limits   BRAIN NATRIURETIC PEPTIDE - Abnormal; Notable for the following components:    Pro-BNP 1,159 (*)     All other components within normal limits   MAGNESIUM   TROPONIN   APTT   D-DIMER, QUANTITATIVE   TROPONIN       All other labs were within normal range or not returned as of this dictation. RADIOLOGY:  XR CHEST PORTABLE   Final Result   Worsening cardiomegaly as compared to 2016; persistent venous hypertension   and bibasilar atelectasis or scarring. No radiographic CHF. COPD. No   consolidation or sizable pleural effusion. ED Course as of 03/31/22 1639   Thu Mar 31, 2022   1337 Twelve lead EKG interpreted by myself:  A 12 lead EKG done at 1331, interpreted by myself, showed a irregular rhythm at a rate of 105bpm.  The NJ interval was  not calculated. The QRS complex was abnormal consistent with a left bundle branch block. .  QRS progression through precordial leads was abnormal consistent with left bundle branch block morphology.   Interpretation: Atrial fibrillation with minimal rapid ventricular response, left bundle branch block morphology without any modified scarbosa criteria for acute MI. No significant changes noted when compared with EKG from 2016 [TS]   1432 Patient's laboratory results reveal slightly elevated hemoglobin, otherwise unremarkable CBC. Basic metabolic panel is grossly unremarkable. Magnesium is normal, troponin is negative and BNP is mildly elevated. Age-adjusted D-dimer is negative. I am going to obtain x-ray of the chest and give 1 dose Lasix and will reevaluate [TS]   8993 Patient's INR is therapeutic [TS]   1526 Worsening cardiomegaly when compared with 2016 noted on chest x-ray [TS]   1632 Rpt troponin negative. Pt didn't actually have CP, just palpitations. She has had 2 - troponins, she does have history of atrial fibrillation, I do suspect most likely that she was having some tachycardia associated with her A. fib at home which was leading to her palpitations. She does have follow-up with her cardiologist arranged for tomorrow, I have talked to her about what to return to the ER for    At this time the patient is without objective evidence of an acute process requiring hospitalization or inpatient management. They have remained hemodynamically stable and are stable for discharge with outpatient follow-up. Standard anticipatory guidance given to patient upon discharge. Have given them a specific time frame in which to follow-up and who to follow-up with. I have also advised them that they should return to the emergency department if they get worse, or not getting better or develop any new or concerning symptoms. Patient demonstrates understanding. [TS]      ED Course User Index  [TS] Deion Burroughs, DO         PROCEDURES:  Unless otherwise noted below, none     Procedures    FINAL IMPRESSION      1. Palpitations    2.  Hypervolemia, unspecified hypervolemia type          DISPOSITION/PLAN   DISPOSITION Decision To Discharge 03/31/2022 04:37:49 PM      PATIENT REFERRED TO:  Chante Hernández [facility] [employer], [address] [place] [address] [address] [place] [address] [phone]    In 3 days      Cardiology  Keep your appointment with cardiology          DISCHARGE MEDICATIONS:  New Prescriptions    No medications on file          (Please note that portions of this note were completed with a voice recognition program.  Efforts were made to edit the dictations but occasionally words are mis-transcribed.)    Steve Caputo DO (electronically signed)  Board Certified Emergency Physician         Steve Caputo DO  03/31/22 7099

## 2022-11-03 ENCOUNTER — HOSPITAL ENCOUNTER (OUTPATIENT)
Facility: CLINIC | Age: 82
Discharge: HOME | End: 2022-11-03
Payer: MEDICARE

## 2022-11-03 PROCEDURE — 83880 ASSAY OF NATRIURETIC PEPTIDE: CPT

## 2022-11-03 PROCEDURE — 80051 ELECTROLYTE PANEL: CPT

## 2022-11-03 PROCEDURE — 84550 ASSAY OF BLOOD/URIC ACID: CPT

## 2022-11-03 PROCEDURE — 36415 COLL VENOUS BLD VENIPUNCTURE: CPT

## 2022-11-03 PROCEDURE — 82306 VITAMIN D 25 HYDROXY: CPT

## 2022-11-03 PROCEDURE — 84520 ASSAY OF UREA NITROGEN: CPT

## 2022-11-03 PROCEDURE — 82565 ASSAY OF CREATININE: CPT

## 2022-11-04 LAB
ANION GAP SERPL CALCULATED.3IONS-SCNC: 15 MMOL/L (ref 9–17)
BUN BLDV-MCNC: 29 MG/DL (ref 8–23)
CHLORIDE BLD-SCNC: 103 MMOL/L (ref 98–107)
CO2: 23 MMOL/L (ref 20–31)
CREAT SERPL-MCNC: 1.59 MG/DL (ref 0.5–0.9)
GFR SERPL CREATININE-BSD FRML MDRD: 32 ML/MIN/1.73M2
POTASSIUM SERPL-SCNC: 4.4 MMOL/L (ref 3.7–5.3)
PRO-BNP: 1340 PG/ML
SODIUM BLD-SCNC: 141 MMOL/L (ref 135–144)
URIC ACID: 5.7 MG/DL (ref 2.4–5.7)
VITAMIN D 25-HYDROXY: 57.6 NG/ML

## 2022-12-21 ENCOUNTER — HOSPITAL ENCOUNTER (OUTPATIENT)
Facility: CLINIC | Age: 82
Discharge: HOME | End: 2022-12-21
Payer: MEDICARE

## 2022-12-21 LAB
ANION GAP SERPL CALCULATED.3IONS-SCNC: 12 MMOL/L (ref 9–17)
BUN BLDV-MCNC: 32 MG/DL (ref 8–23)
CHLORIDE BLD-SCNC: 102 MMOL/L (ref 98–107)
CO2: 27 MMOL/L (ref 20–31)
CREAT SERPL-MCNC: 1.09 MG/DL (ref 0.5–0.9)
GFR SERPL CREATININE-BSD FRML MDRD: 50 ML/MIN/1.73M2
POTASSIUM SERPL-SCNC: 4.7 MMOL/L (ref 3.7–5.3)
SODIUM BLD-SCNC: 141 MMOL/L (ref 135–144)

## 2022-12-21 PROCEDURE — 84520 ASSAY OF UREA NITROGEN: CPT

## 2022-12-21 PROCEDURE — 82565 ASSAY OF CREATININE: CPT

## 2022-12-21 PROCEDURE — 36415 COLL VENOUS BLD VENIPUNCTURE: CPT

## 2022-12-21 PROCEDURE — 80051 ELECTROLYTE PANEL: CPT

## 2023-01-24 ENCOUNTER — HOSPITAL ENCOUNTER (OUTPATIENT)
Facility: CLINIC | Age: 83
Discharge: HOME | End: 2023-01-24
Payer: MEDICARE

## 2023-01-24 LAB
ANION GAP SERPL CALCULATED.3IONS-SCNC: 9 MMOL/L (ref 9–17)
BUN BLDV-MCNC: 24 MG/DL (ref 8–23)
CHLORIDE BLD-SCNC: 98 MMOL/L (ref 98–107)
CO2: 28 MMOL/L (ref 20–31)
CREAT SERPL-MCNC: 1.05 MG/DL (ref 0.5–0.9)
GFR SERPL CREATININE-BSD FRML MDRD: 52 ML/MIN/1.73M2
POTASSIUM SERPL-SCNC: 4.4 MMOL/L (ref 3.7–5.3)
PRO-BNP: 2083 PG/ML
SODIUM BLD-SCNC: 135 MMOL/L (ref 135–144)

## 2023-01-24 PROCEDURE — 83880 ASSAY OF NATRIURETIC PEPTIDE: CPT

## 2023-01-24 PROCEDURE — 84520 ASSAY OF UREA NITROGEN: CPT

## 2023-01-24 PROCEDURE — 82565 ASSAY OF CREATININE: CPT

## 2023-01-24 PROCEDURE — 80051 ELECTROLYTE PANEL: CPT

## 2023-01-24 PROCEDURE — 36415 COLL VENOUS BLD VENIPUNCTURE: CPT

## 2023-02-21 ENCOUNTER — HOSPITAL ENCOUNTER (OUTPATIENT)
Dept: NUCLEAR MEDICINE | Age: 83
Discharge: HOME | End: 2023-02-23
Payer: MEDICARE

## 2023-02-21 ENCOUNTER — HOSPITAL ENCOUNTER (OUTPATIENT)
Dept: NON INVASIVE DIAGNOSTICS | Age: 83
Discharge: HOME | End: 2023-02-21
Payer: MEDICARE

## 2023-02-21 VITALS — SYSTOLIC BLOOD PRESSURE: 179 MMHG | HEART RATE: 100 BPM | RESPIRATION RATE: 20 BRPM | DIASTOLIC BLOOD PRESSURE: 107 MMHG

## 2023-02-21 DIAGNOSIS — R07.89 OTHER CHEST PAIN: ICD-10-CM

## 2023-02-21 DIAGNOSIS — I49.2 JUNCTIONAL PREMATURE DEPOLARIZATION (HCC): ICD-10-CM

## 2023-02-21 LAB
LV EF: 38 %
LVEF MODALITY: NORMAL

## 2023-02-21 PROCEDURE — 78452 HT MUSCLE IMAGE SPECT MULT: CPT

## 2023-02-21 PROCEDURE — A9500 TC99M SESTAMIBI: HCPCS | Performed by: INTERNAL MEDICINE

## 2023-02-21 PROCEDURE — 3430000000 HC RX DIAGNOSTIC RADIOPHARMACEUTICAL: Performed by: INTERNAL MEDICINE

## 2023-02-21 PROCEDURE — 2580000003 HC RX 258: Performed by: INTERNAL MEDICINE

## 2023-02-21 PROCEDURE — 6360000002 HC RX W HCPCS: Performed by: INTERNAL MEDICINE

## 2023-02-21 PROCEDURE — 93017 CV STRESS TEST TRACING ONLY: CPT

## 2023-02-21 RX ORDER — AMINOPHYLLINE DIHYDRATE 25 MG/ML
50 INJECTION, SOLUTION INTRAVENOUS PRN
Status: DISCONTINUED | OUTPATIENT
Start: 2023-02-21 | End: 2023-02-21 | Stop reason: HOSPADM

## 2023-02-21 RX ORDER — ATROPINE SULFATE 0.1 MG/ML
0.5 INJECTION INTRAVENOUS EVERY 5 MIN PRN
Status: DISCONTINUED | OUTPATIENT
Start: 2023-02-21 | End: 2023-02-21 | Stop reason: HOSPADM

## 2023-02-21 RX ORDER — SODIUM CHLORIDE 0.9 % (FLUSH) 0.9 %
5-40 SYRINGE (ML) INJECTION PRN
Status: DISCONTINUED | OUTPATIENT
Start: 2023-02-21 | End: 2023-02-21 | Stop reason: HOSPADM

## 2023-02-21 RX ORDER — TECHNETIUM TC-99M SESTAMIBI 1 MG/10ML
15 INJECTION INTRAVENOUS
Status: COMPLETED | OUTPATIENT
Start: 2023-02-21 | End: 2023-02-21

## 2023-02-21 RX ORDER — METOPROLOL TARTRATE 5 MG/5ML
5 INJECTION INTRAVENOUS EVERY 5 MIN PRN
Status: DISCONTINUED | OUTPATIENT
Start: 2023-02-21 | End: 2023-02-21 | Stop reason: HOSPADM

## 2023-02-21 RX ORDER — TECHNETIUM TC-99M SESTAMIBI 1 MG/10ML
42.6 INJECTION INTRAVENOUS
Status: COMPLETED | OUTPATIENT
Start: 2023-02-21 | End: 2023-02-21

## 2023-02-21 RX ORDER — NITROGLYCERIN 0.4 MG/1
0.4 TABLET SUBLINGUAL EVERY 5 MIN PRN
Status: DISCONTINUED | OUTPATIENT
Start: 2023-02-21 | End: 2023-02-21 | Stop reason: HOSPADM

## 2023-02-21 RX ORDER — SODIUM CHLORIDE 9 MG/ML
500 INJECTION, SOLUTION INTRAVENOUS CONTINUOUS PRN
Status: DISCONTINUED | OUTPATIENT
Start: 2023-02-21 | End: 2023-02-21 | Stop reason: HOSPADM

## 2023-02-21 RX ADMIN — SODIUM CHLORIDE, PRESERVATIVE FREE 10 ML: 5 INJECTION INTRAVENOUS at 08:33

## 2023-02-21 RX ADMIN — REGADENOSON 0.4 MG: 0.08 INJECTION, SOLUTION INTRAVENOUS at 08:33

## 2023-02-21 RX ADMIN — Medication 42.6 MILLICURIE: at 08:34

## 2023-02-21 RX ADMIN — Medication 15 MILLICURIE: at 07:20

## 2023-02-21 NOTE — FLOWSHEET NOTE
Pt tolerated lexiscan without experiencing side effect, resting on cart in nad waiting for further testing call light within sight and reach

## 2023-02-21 NOTE — PROCEDURES
[facility]                 [address]. [facility]                              CARDIAC STRESS TEST    PATIENT NAME: Demi Varela                      :        1940  MED REC NO:   [id number]                             ROOM:  ACCOUNT NO:   [de-identified]                           ADMIT DATE: 2023  PROVIDER:     Lu Tristan MD    DATE OF STUDY:  2023    [facility] PHARMACOLOGIC STRESS TEST    ATTENDING PROVIDER:  Casey Anders. Lyn Teresa MD    PRIMARY CARE PROVIDER:  Kandy Andrews MD    PERFORMING PHYSICIAN:  Lu Tristan MD    Indication:  Chest pain. The baseline blood pressure was 174/121 mmHg with a heart rate of 109. Peggi Hazy was infused using the standard protocol. The patient tolerated the procedure well with no complaints. The baseline EKG demonstrated atrial fibrillation, left bundle branch  block. No atrial or ventricular ectopy was noted during the study. EKG response is uninterpretable due to left bundle branch block. IMPRESSION:  EKG portion of pharmacologic stress test is uninterpretable  for ischemia. Nuclear portion reported separately.           Paige Ferrell MD    D: 2023 9:02:35       T: 2023 9:06:20     [email or web address]  Job#: [id number]     Doc#: Unknown

## 2023-03-04 ENCOUNTER — HOSPITAL ENCOUNTER (OUTPATIENT)
Facility: CLINIC | Age: 83
Discharge: HOME | End: 2023-03-04
Payer: MEDICARE

## 2023-03-04 LAB
ANION GAP SERPL CALCULATED.3IONS-SCNC: 8 MMOL/L (ref 9–17)
BUN SERPL-MCNC: 27 MG/DL (ref 8–23)
CHLORIDE SERPL-SCNC: 101 MMOL/L (ref 98–107)
CO2 SERPL-SCNC: 31 MMOL/L (ref 20–31)
CREAT SERPL-MCNC: 0.93 MG/DL (ref 0.5–0.9)
GFR SERPL CREATININE-BSD FRML MDRD: >60 ML/MIN/1.73M2
POTASSIUM SERPL-SCNC: 4.7 MMOL/L (ref 3.7–5.3)
SODIUM SERPL-SCNC: 140 MMOL/L (ref 135–144)

## 2023-03-04 PROCEDURE — 80051 ELECTROLYTE PANEL: CPT

## 2023-03-04 PROCEDURE — 84520 ASSAY OF UREA NITROGEN: CPT

## 2023-03-04 PROCEDURE — 36415 COLL VENOUS BLD VENIPUNCTURE: CPT

## 2023-03-04 PROCEDURE — 82565 ASSAY OF CREATININE: CPT

## 2023-06-05 ENCOUNTER — HOSPITAL ENCOUNTER (OUTPATIENT)
Facility: CLINIC | Age: 83
Discharge: HOME | End: 2023-06-05
Payer: MEDICARE

## 2023-06-05 LAB
ANION GAP SERPL CALCULATED.3IONS-SCNC: 12 MMOL/L (ref 9–17)
BUN SERPL-MCNC: 21 MG/DL (ref 8–23)
CALCIUM SERPL-MCNC: 9.5 MG/DL (ref 8.6–10.4)
CHLORIDE SERPL-SCNC: 103 MMOL/L (ref 98–107)
CO2 SERPL-SCNC: 26 MMOL/L (ref 20–31)
CREAT SERPL-MCNC: 0.7 MG/DL (ref 0.5–0.9)
ERYTHROCYTE [DISTWIDTH] IN BLOOD BY AUTOMATED COUNT: 16.2 % (ref 12.5–15.4)
GFR SERPL CREATININE-BSD FRML MDRD: >60 ML/MIN/1.73M2
GLUCOSE SERPL-MCNC: 120 MG/DL (ref 70–99)
HCT VFR BLD AUTO: 46.8 % (ref 36–46)
HGB BLD-MCNC: 15.5 G/DL (ref 12–16)
INR PPP: 1.9
MCH RBC QN AUTO: 31.4 PG (ref 26–34)
MCHC RBC AUTO-ENTMCNC: 33.2 G/DL (ref 31–37)
MCV RBC AUTO: 94.6 FL (ref 80–100)
PLATELET # BLD AUTO: 168 K/UL (ref 140–450)
PMV BLD AUTO: 8 FL (ref 6–12)
POTASSIUM SERPL-SCNC: 3.6 MMOL/L (ref 3.7–5.3)
PROTHROMBIN TIME: 18.8 SEC (ref 9.4–12.6)
RBC # BLD AUTO: 4.94 M/UL (ref 4–5.2)
SODIUM SERPL-SCNC: 141 MMOL/L (ref 135–144)
WBC OTHER # BLD: 4.2 K/UL (ref 3.5–11)

## 2023-06-05 PROCEDURE — 80048 BASIC METABOLIC PNL TOTAL CA: CPT

## 2023-06-05 PROCEDURE — 85027 COMPLETE CBC AUTOMATED: CPT

## 2023-06-05 PROCEDURE — 85610 PROTHROMBIN TIME: CPT

## 2023-06-05 PROCEDURE — 36415 COLL VENOUS BLD VENIPUNCTURE: CPT

## 2023-07-09 ENCOUNTER — APPOINTMENT (OUTPATIENT)
Dept: GENERAL RADIOLOGY | Facility: CLINIC | Age: 83
End: 2023-07-09
Payer: MEDICARE

## 2023-07-09 ENCOUNTER — APPOINTMENT (OUTPATIENT)
Dept: CT IMAGING | Facility: CLINIC | Age: 83
End: 2023-07-09
Payer: MEDICARE

## 2023-07-09 ENCOUNTER — HOSPITAL ENCOUNTER (EMERGENCY)
Facility: CLINIC | Age: 83
Discharge: HOME | End: 2023-07-09
Attending: EMERGENCY MEDICINE
Payer: MEDICARE

## 2023-07-09 VITALS
RESPIRATION RATE: 22 BRPM | WEIGHT: 192 LBS | HEIGHT: 64 IN | HEART RATE: 70 BPM | TEMPERATURE: 98.2 F | BODY MASS INDEX: 32.78 KG/M2 | DIASTOLIC BLOOD PRESSURE: 101 MMHG | OXYGEN SATURATION: 94 % | SYSTOLIC BLOOD PRESSURE: 134 MMHG

## 2023-07-09 DIAGNOSIS — R10.33 PERIUMBILICAL ABDOMINAL PAIN: Primary | ICD-10-CM

## 2023-07-09 DIAGNOSIS — N28.89 NODULE OF KIDNEY: ICD-10-CM

## 2023-07-09 LAB
ALBUMIN SERPL-MCNC: 4.4 G/DL (ref 3.5–5.2)
ALBUMIN/GLOB SERPL: 1.3 {RATIO} (ref 1–2.5)
ALP SERPL-CCNC: 100 U/L (ref 35–104)
ALT SERPL-CCNC: 19 U/L (ref 5–33)
AMYLASE SERPL-CCNC: 38 U/L (ref 28–100)
ANION GAP SERPL CALCULATED.3IONS-SCNC: 10 MMOL/L (ref 9–17)
AST SERPL-CCNC: 31 U/L
BACTERIA URNS QL MICRO: ABNORMAL
BASOPHILS # BLD: 0 K/UL (ref 0–0.2)
BASOPHILS NFR BLD: 0 % (ref 0–2)
BILIRUB SERPL-MCNC: 0.8 MG/DL (ref 0.3–1.2)
BILIRUB UR QL STRIP: NEGATIVE
BUN SERPL-MCNC: 20 MG/DL (ref 8–23)
CALCIUM SERPL-MCNC: 9.8 MG/DL (ref 8.6–10.4)
CHARACTER UR: ABNORMAL
CHLORIDE SERPL-SCNC: 99 MMOL/L (ref 98–107)
CLARITY UR: CLEAR
CO2 SERPL-SCNC: 27 MMOL/L (ref 20–31)
COLOR UR: YELLOW
CREAT SERPL-MCNC: 1.1 MG/DL (ref 0.5–0.9)
DATE LAST DOSE: NORMAL
DIGOXIN DOSE TIME: NORMAL
DIGOXIN DOSE: NORMAL MG
DIGOXIN SERPL-MCNC: 0.6 NG/ML (ref 0.5–2)
EOSINOPHIL # BLD: 0.1 K/UL (ref 0–0.4)
EOSINOPHILS RELATIVE PERCENT: 2 % (ref 1–4)
EPI CELLS #/AREA URNS HPF: ABNORMAL /HPF (ref 0–5)
ERYTHROCYTE [DISTWIDTH] IN BLOOD BY AUTOMATED COUNT: 15.7 % (ref 12.5–15.4)
GFR SERPL CREATININE-BSD FRML MDRD: 49 ML/MIN/1.73M2
GLUCOSE SERPL-MCNC: 142 MG/DL (ref 70–99)
GLUCOSE UR STRIP-MCNC: NEGATIVE MG/DL
HCT VFR BLD AUTO: 45.8 % (ref 36–46)
HGB BLD-MCNC: 15.2 G/DL (ref 12–16)
HGB UR QL STRIP.AUTO: ABNORMAL
INR PPP: 2.3
KETONES UR STRIP-MCNC: NEGATIVE MG/DL
LEUKOCYTE ESTERASE UR QL STRIP: NEGATIVE
LIPASE SERPL-CCNC: 26 U/L (ref 13–60)
LYMPHOCYTES # BLD: 19 % (ref 24–44)
LYMPHOCYTES NFR BLD: 1 K/UL (ref 1–4.8)
MCH RBC QN AUTO: 30.9 PG (ref 26–34)
MCHC RBC AUTO-ENTMCNC: 33.2 G/DL (ref 31–37)
MCV RBC AUTO: 93.3 FL (ref 80–100)
MONOCYTES NFR BLD: 0.5 K/UL (ref 0.1–1.2)
MONOCYTES NFR BLD: 9 % (ref 2–11)
NEUTROPHILS NFR BLD: 70 % (ref 36–66)
NEUTS SEG NFR BLD: 3.6 K/UL (ref 1.8–7.7)
NITRITE UR QL STRIP: NEGATIVE
PARTIAL THROMBOPLASTIN TIME: 31.2 SEC (ref 21.3–31.3)
PH UR STRIP: 6.5 [PH] (ref 5–8)
PLATELET # BLD AUTO: 166 K/UL (ref 140–450)
PMV BLD AUTO: 8.1 FL (ref 6–12)
POTASSIUM SERPL-SCNC: 4 MMOL/L (ref 3.7–5.3)
PROT SERPL-MCNC: 7.8 G/DL (ref 6.4–8.3)
PROT UR STRIP-MCNC: NEGATIVE MG/DL
PROTHROMBIN TIME: 22.4 SEC (ref 9.4–12.6)
RBC # BLD AUTO: 4.91 M/UL (ref 4–5.2)
RBC #/AREA URNS HPF: ABNORMAL /HPF (ref 0–2)
SODIUM SERPL-SCNC: 136 MMOL/L (ref 135–144)
SP GR UR STRIP: 1.02 (ref 1–1.03)
TROPONIN I SERPL HS-MCNC: 11 NG/L (ref 0–14)
TROPONIN I SERPL HS-MCNC: 12 NG/L (ref 0–14)
UROBILINOGEN UR STRIP-ACNC: NORMAL
WBC #/AREA URNS HPF: ABNORMAL /HPF (ref 0–5)
WBC OTHER # BLD: 5.2 K/UL (ref 3.5–11)

## 2023-07-09 PROCEDURE — 36415 COLL VENOUS BLD VENIPUNCTURE: CPT

## 2023-07-09 PROCEDURE — 99285 EMERGENCY DEPT VISIT HI MDM: CPT

## 2023-07-09 PROCEDURE — 71045 X-RAY EXAM CHEST 1 VIEW: CPT

## 2023-07-09 PROCEDURE — 80162 ASSAY OF DIGOXIN TOTAL: CPT

## 2023-07-09 PROCEDURE — 2580000003 HC RX 258: Performed by: EMERGENCY MEDICINE

## 2023-07-09 PROCEDURE — 84484 ASSAY OF TROPONIN QUANT: CPT

## 2023-07-09 PROCEDURE — 74176 CT ABD & PELVIS W/O CONTRAST: CPT

## 2023-07-09 PROCEDURE — 83690 ASSAY OF LIPASE: CPT

## 2023-07-09 PROCEDURE — 6370000000 HC RX 637 (ALT 250 FOR IP): Performed by: EMERGENCY MEDICINE

## 2023-07-09 PROCEDURE — 81001 URINALYSIS AUTO W/SCOPE: CPT

## 2023-07-09 PROCEDURE — 85027 COMPLETE CBC AUTOMATED: CPT

## 2023-07-09 PROCEDURE — 85730 THROMBOPLASTIN TIME PARTIAL: CPT

## 2023-07-09 PROCEDURE — 82150 ASSAY OF AMYLASE: CPT

## 2023-07-09 PROCEDURE — 80053 COMPREHEN METABOLIC PANEL: CPT

## 2023-07-09 PROCEDURE — 85610 PROTHROMBIN TIME: CPT

## 2023-07-09 PROCEDURE — 93005 ELECTROCARDIOGRAM TRACING: CPT | Performed by: EMERGENCY MEDICINE

## 2023-07-09 RX ORDER — 0.9 % SODIUM CHLORIDE 0.9 %
500 INTRAVENOUS SOLUTION INTRAVENOUS ONCE
Status: COMPLETED | OUTPATIENT
Start: 2023-07-09 | End: 2023-07-09

## 2023-07-09 RX ORDER — ONDANSETRON 4 MG/1
4 TABLET, ORALLY DISINTEGRATING ORAL ONCE
Status: COMPLETED | OUTPATIENT
Start: 2023-07-09 | End: 2023-07-09

## 2023-07-09 RX ORDER — ACETAMINOPHEN 325 MG/1
650 TABLET ORAL ONCE
Status: COMPLETED | OUTPATIENT
Start: 2023-07-09 | End: 2023-07-09

## 2023-07-09 RX ORDER — DIGOXIN 125 MCG
125 TABLET ORAL DAILY
COMMUNITY

## 2023-07-09 RX ORDER — ONDANSETRON 4 MG/1
4 TABLET, ORALLY DISINTEGRATING ORAL EVERY 8 HOURS PRN
Qty: 20 TABLET | Refills: 0 | Status: SHIPPED | OUTPATIENT
Start: 2023-07-09

## 2023-07-09 RX ADMIN — ONDANSETRON 4 MG: 4 TABLET, ORALLY DISINTEGRATING ORAL at 17:09

## 2023-07-09 RX ADMIN — SODIUM CHLORIDE 500 ML: 9 INJECTION, SOLUTION INTRAVENOUS at 17:11

## 2023-07-09 RX ADMIN — ACETAMINOPHEN 650 MG: 325 TABLET ORAL at 17:09

## 2023-07-09 ASSESSMENT — PAIN - FUNCTIONAL ASSESSMENT: PAIN_FUNCTIONAL_ASSESSMENT: 0-10

## 2023-07-09 ASSESSMENT — PAIN DESCRIPTION - PAIN TYPE: TYPE: ACUTE PAIN

## 2023-07-09 ASSESSMENT — PAIN DESCRIPTION - DESCRIPTORS: DESCRIPTORS: DULL

## 2023-07-09 ASSESSMENT — PAIN DESCRIPTION - FREQUENCY: FREQUENCY: CONTINUOUS

## 2023-07-09 ASSESSMENT — PAIN SCALES - GENERAL
PAINLEVEL_OUTOF10: 5
PAINLEVEL_OUTOF10: 5

## 2023-07-09 ASSESSMENT — PAIN DESCRIPTION - LOCATION: LOCATION: ABDOMEN

## 2023-07-10 LAB
EKG ATRIAL RATE: 79 BPM
EKG Q-T INTERVAL: 440 MS
EKG QRS DURATION: 156 MS
EKG QTC CALCULATION (BAZETT): 495 MS
EKG R AXIS: -39 DEGREES
EKG T AXIS: 122 DEGREES
EKG VENTRICULAR RATE: 76 BPM

## 2023-07-18 ENCOUNTER — HOSPITAL ENCOUNTER (OUTPATIENT)
Facility: CLINIC | Age: 83
Discharge: HOME | End: 2023-07-18
Payer: MEDICARE

## 2023-07-18 LAB
ANION GAP SERPL CALCULATED.3IONS-SCNC: 10 MMOL/L (ref 9–17)
BNP SERPL-MCNC: 1265 PG/ML
BUN SERPL-MCNC: 14 MG/DL (ref 8–23)
CHLORIDE SERPL-SCNC: 106 MMOL/L (ref 98–107)
CO2 SERPL-SCNC: 27 MMOL/L (ref 20–31)
CREAT SERPL-MCNC: 0.7 MG/DL (ref 0.5–0.9)
GFR SERPL CREATININE-BSD FRML MDRD: >60 ML/MIN/1.73M2
POTASSIUM SERPL-SCNC: 3.9 MMOL/L (ref 3.7–5.3)
SODIUM SERPL-SCNC: 143 MMOL/L (ref 135–144)

## 2023-07-18 PROCEDURE — 83880 ASSAY OF NATRIURETIC PEPTIDE: CPT

## 2023-07-18 PROCEDURE — 84520 ASSAY OF UREA NITROGEN: CPT

## 2023-07-18 PROCEDURE — 80051 ELECTROLYTE PANEL: CPT

## 2023-07-18 PROCEDURE — 36415 COLL VENOUS BLD VENIPUNCTURE: CPT

## 2023-07-18 PROCEDURE — 82565 ASSAY OF CREATININE: CPT

## 2023-08-03 ENCOUNTER — APPOINTMENT (OUTPATIENT)
Dept: CT IMAGING | Facility: CLINIC | Age: 83
End: 2023-08-03
Payer: MEDICARE

## 2023-08-03 ENCOUNTER — APPOINTMENT (OUTPATIENT)
Dept: GENERAL RADIOLOGY | Facility: CLINIC | Age: 83
End: 2023-08-03
Payer: MEDICARE

## 2023-08-03 ENCOUNTER — HOSPITAL ENCOUNTER (EMERGENCY)
Facility: CLINIC | Age: 83
Discharge: HOME | End: 2023-08-03
Attending: EMERGENCY MEDICINE
Payer: MEDICARE

## 2023-08-03 VITALS
SYSTOLIC BLOOD PRESSURE: 151 MMHG | HEIGHT: 64 IN | WEIGHT: 192 LBS | TEMPERATURE: 97.9 F | OXYGEN SATURATION: 94 % | BODY MASS INDEX: 32.78 KG/M2 | DIASTOLIC BLOOD PRESSURE: 86 MMHG | RESPIRATION RATE: 23 BRPM | HEART RATE: 72 BPM

## 2023-08-03 DIAGNOSIS — R79.1 ELEVATED INR: ICD-10-CM

## 2023-08-03 DIAGNOSIS — K92.2 GASTROINTESTINAL HEMORRHAGE, UNSPECIFIED GASTROINTESTINAL HEMORRHAGE TYPE: ICD-10-CM

## 2023-08-03 DIAGNOSIS — M70.21 OLECRANON BURSITIS OF RIGHT ELBOW: Primary | ICD-10-CM

## 2023-08-03 LAB
ALBUMIN SERPL-MCNC: 4.4 G/DL (ref 3.5–5.2)
ALBUMIN/GLOB SERPL: 1.2 {RATIO} (ref 1–2.5)
ALP SERPL-CCNC: 105 U/L (ref 35–104)
ALT SERPL-CCNC: 25 U/L (ref 5–33)
AMYLASE SERPL-CCNC: 38 U/L (ref 28–100)
ANION GAP SERPL CALCULATED.3IONS-SCNC: 13 MMOL/L (ref 9–17)
AST SERPL-CCNC: 31 U/L
BACTERIA URNS QL MICRO: ABNORMAL
BASOPHILS # BLD: 0 K/UL (ref 0–0.2)
BASOPHILS NFR BLD: 0 % (ref 0–2)
BILIRUB SERPL-MCNC: 0.9 MG/DL (ref 0.3–1.2)
BILIRUB UR QL STRIP: NEGATIVE
BUN SERPL-MCNC: 14 MG/DL (ref 8–23)
CALCIUM SERPL-MCNC: 9.9 MG/DL (ref 8.6–10.4)
CHARACTER UR: ABNORMAL
CHLORIDE SERPL-SCNC: 100 MMOL/L (ref 98–107)
CLARITY UR: CLEAR
CO2 SERPL-SCNC: 28 MMOL/L (ref 20–31)
COLOR UR: YELLOW
CREAT SERPL-MCNC: 0.7 MG/DL (ref 0.5–0.9)
EOSINOPHIL # BLD: 0 K/UL (ref 0–0.4)
EOSINOPHILS RELATIVE PERCENT: 1 % (ref 1–4)
EPI CELLS #/AREA URNS HPF: ABNORMAL /HPF (ref 0–5)
ERYTHROCYTE [DISTWIDTH] IN BLOOD BY AUTOMATED COUNT: 15.2 % (ref 12.5–15.4)
GFR SERPL CREATININE-BSD FRML MDRD: >60 ML/MIN/1.73M2
GLUCOSE SERPL-MCNC: 123 MG/DL (ref 70–99)
GLUCOSE UR STRIP-MCNC: NEGATIVE MG/DL
HCT VFR BLD AUTO: 46.7 % (ref 36–46)
HGB BLD-MCNC: 15.5 G/DL (ref 12–16)
HGB UR QL STRIP.AUTO: ABNORMAL
INR PPP: 4
KETONES UR STRIP-MCNC: NEGATIVE MG/DL
LEUKOCYTE ESTERASE UR QL STRIP: NEGATIVE
LIPASE SERPL-CCNC: 28 U/L (ref 13–60)
LYMPHOCYTES NFR BLD: 1.2 K/UL (ref 1–4.8)
LYMPHOCYTES RELATIVE PERCENT: 26 % (ref 24–44)
MCH RBC QN AUTO: 31.1 PG (ref 26–34)
MCHC RBC AUTO-ENTMCNC: 33.2 G/DL (ref 31–37)
MCV RBC AUTO: 93.5 FL (ref 80–100)
MONOCYTES NFR BLD: 0.5 K/UL (ref 0.1–1.2)
MONOCYTES NFR BLD: 10 % (ref 2–11)
NEUTROPHILS NFR BLD: 63 % (ref 36–66)
NEUTS SEG NFR BLD: 3 K/UL (ref 1.8–7.7)
NITRITE UR QL STRIP: NEGATIVE
PARTIAL THROMBOPLASTIN TIME: 37 SEC (ref 21.3–31.3)
PH UR STRIP: 7.5 [PH] (ref 5–8)
PLATELET # BLD AUTO: 184 K/UL (ref 140–450)
PMV BLD AUTO: 7.9 FL (ref 6–12)
POTASSIUM SERPL-SCNC: 3.7 MMOL/L (ref 3.7–5.3)
PROT SERPL-MCNC: 8.2 G/DL (ref 6.4–8.3)
PROT UR STRIP-MCNC: NEGATIVE MG/DL
PROTHROMBIN TIME: 37 SEC (ref 9.4–12.6)
RBC # BLD AUTO: 4.99 M/UL (ref 4–5.2)
RBC #/AREA URNS HPF: ABNORMAL /HPF (ref 0–2)
SODIUM SERPL-SCNC: 141 MMOL/L (ref 135–144)
SP GR UR STRIP: 1.01 (ref 1–1.03)
TROPONIN I SERPL HS-MCNC: 10 NG/L (ref 0–14)
UROBILINOGEN UR STRIP-ACNC: NORMAL EU/DL (ref 0–1)
WBC #/AREA URNS HPF: ABNORMAL /HPF (ref 0–5)
WBC OTHER # BLD: 4.7 K/UL (ref 3.5–11)

## 2023-08-03 PROCEDURE — 74177 CT ABD & PELVIS W/CONTRAST: CPT

## 2023-08-03 PROCEDURE — 82150 ASSAY OF AMYLASE: CPT

## 2023-08-03 PROCEDURE — 93005 ELECTROCARDIOGRAM TRACING: CPT | Performed by: EMERGENCY MEDICINE

## 2023-08-03 PROCEDURE — 85610 PROTHROMBIN TIME: CPT

## 2023-08-03 PROCEDURE — 36415 COLL VENOUS BLD VENIPUNCTURE: CPT

## 2023-08-03 PROCEDURE — 85730 THROMBOPLASTIN TIME PARTIAL: CPT

## 2023-08-03 PROCEDURE — 99285 EMERGENCY DEPT VISIT HI MDM: CPT

## 2023-08-03 PROCEDURE — 80053 COMPREHEN METABOLIC PANEL: CPT

## 2023-08-03 PROCEDURE — 83690 ASSAY OF LIPASE: CPT

## 2023-08-03 PROCEDURE — 84484 ASSAY OF TROPONIN QUANT: CPT

## 2023-08-03 PROCEDURE — 2580000003 HC RX 258: Performed by: EMERGENCY MEDICINE

## 2023-08-03 PROCEDURE — 6360000004 HC RX CONTRAST MEDICATION: Performed by: EMERGENCY MEDICINE

## 2023-08-03 PROCEDURE — 73080 X-RAY EXAM OF ELBOW: CPT

## 2023-08-03 PROCEDURE — 81001 URINALYSIS AUTO W/SCOPE: CPT

## 2023-08-03 PROCEDURE — 85025 COMPLETE CBC W/AUTO DIFF WBC: CPT

## 2023-08-03 RX ORDER — 0.9 % SODIUM CHLORIDE 0.9 %
80 INTRAVENOUS SOLUTION INTRAVENOUS ONCE
Status: COMPLETED | OUTPATIENT
Start: 2023-08-03 | End: 2023-08-03

## 2023-08-03 RX ORDER — SODIUM CHLORIDE 0.9 % (FLUSH) 0.9 %
10 SYRINGE (ML) INJECTION PRN
Status: DISCONTINUED | OUTPATIENT
Start: 2023-08-03 | End: 2023-08-03 | Stop reason: HOSPADM

## 2023-08-03 RX ORDER — 0.9 % SODIUM CHLORIDE 0.9 %
500 INTRAVENOUS SOLUTION INTRAVENOUS ONCE
Status: COMPLETED | OUTPATIENT
Start: 2023-08-03 | End: 2023-08-03

## 2023-08-03 RX ADMIN — SODIUM CHLORIDE, PRESERVATIVE FREE 10 ML: 5 INJECTION INTRAVENOUS at 14:01

## 2023-08-03 RX ADMIN — IOPAMIDOL 75 ML: 755 INJECTION, SOLUTION INTRAVENOUS at 14:00

## 2023-08-03 RX ADMIN — SODIUM CHLORIDE 80 ML: 9 INJECTION, SOLUTION INTRAVENOUS at 14:00

## 2023-08-03 RX ADMIN — SODIUM CHLORIDE 500 ML: 9 INJECTION, SOLUTION INTRAVENOUS at 12:58

## 2023-08-03 ASSESSMENT — PAIN - FUNCTIONAL ASSESSMENT: PAIN_FUNCTIONAL_ASSESSMENT: NONE - DENIES PAIN

## 2023-08-03 NOTE — ED PROVIDER NOTES
care (DM,HTN,CA, etc): Fibrillation, on blood thinners    Social Determinants of Health affecting care (unable to care for self, lives alone, unemployed, homeless,etc): None      History source(s) (patient,spouse,parent,family,friend,EMS,etc): Patient      Review of external sources (ECF,Hospital records,EMS report, radiology reports, etc): Previous hospital records      Tests considered but not ordered: None      Independent interpretation of tests (eg.  X-ray, CAT scan, Doppler studies, EKG): EKG    Discussion of x-ray results with radiology: No     Consults: None      Consideration for admission/observation (even if discharged): Hemodynamically stable and has no anemia. While I think she does have an intermittent GI bleed, I do not think she needs to be admitted. The patient has an elevated INR. She should cut back on her Coumadin dosing. She also has an olecranon hematoma, likely from minor trauma. Her x-ray is negative. Prescription considerations: The patient should decrease her Coumadin dosing to 2 mg a day instead of alternating it between 2 and 3 mg. She should do this for the next couple weeks and then have her INR rechecked. Sepsis considered: Not applicable      Critical Care note written: Not applicable        DIAGNOSTIC RESULTS     EKG: All EKG's are interpreted by the Emergency Department Physician who either signs or Co-signs this chart in the absence of a cardiologist.    A-fib with frequent ventricularly paced complexes. Rate 77, Axis I 94, , QTc 430. No significant change compared to previous. RADIOLOGY:   I reviewed the radiologist interpretations:  XR ELBOW RIGHT (MIN 3 VIEWS)   Final Result   1. No acute osseous abnormality in the right elbow. 2.  Moderate soft tissue swelling overlying the olecranon process, possibly   related to olecranon bursitis versus hematoma. CT ABDOMEN PELVIS W IV CONTRAST Additional Contrast? None   Final Result   1.   No acute hemorrhage type          DISPOSITION/PLAN   DISPOSITION Decision To Discharge 08/03/2023 03:36:35 PM      Condition on Disposition    good    PATIENT REFERRED TO:  Gilberto Huang MD  [address] [facility] [address] [place] [address]  [phone]    In 1 week      Nina Genao [facility] [address]  [phone]    In 3 days      Nina Genao MD  [facility] / [address] [place] [address] 3572            DISCHARGE MEDICATIONS:  New Prescriptions    No medications on file       (Please note that portions of this note were completed with a voice recognition program.  Efforts were made to edit the dictations but occasionally words are mis-transcribed.)    Jose Alejandro Hernandez MD,, MD   Attending Emergency Physician       Jose Alejandro Hernandez MD  08/03/23 [id number]

## 2023-08-03 NOTE — DISCHARGE INSTRUCTIONS
Wear Ace wrap on elbow. Elevate arm. Adjust your Coumadin to 2 mg a day for the next couple of weeks and then have your INR rechecked. See the orthopedist and schedule an appointment for the GI specialist.  Return for black stools, abdominal pain, weakness, dizziness, or if worse in any way. Please understand that at this time there is no evidence for a more serious underlying process, but that early in the process of an illness or injury, an emergency department workup can be falsely reassuring. You should contact your family doctor within the next 48 hours for a follow up appointment    [address]!!!    From [facility]) and [facility]    On behalf of the Emergency Department staff at [facility]), I would like to thank you for giving us the opportunity to address your health care needs and concerns. We hope that during your visit, our service was delivered in a professional and caring manner. Please keep [facility]) in mind as we walk with you down the path to your own personal wellness. Please expect an automated text message or email from us so we can ask a few questions about your health and progress. Based on your answers, a clinician may call you back to offer help and instructions. Please understand that early in the process of an illness or injury, an emergency department workup can be falsely reassuring. If you notice any worsening, changing or persistent symptoms please call your family doctor or return to the ER immediately. Tell us how we did during your visit at [email or web address]   and let us know about your experience

## 2023-08-04 ENCOUNTER — OFFICE VISIT (OUTPATIENT)
Dept: ORTHOPEDIC SURGERY | Age: 83
End: 2023-08-04
Payer: MEDICARE

## 2023-08-04 ENCOUNTER — TELEPHONE (OUTPATIENT)
Dept: FAMILY MEDICINE CLINIC | Age: 83
End: 2023-08-04

## 2023-08-04 VITALS — BODY MASS INDEX: 33.12 KG/M2 | HEIGHT: 64 IN | RESPIRATION RATE: 16 BRPM | WEIGHT: 194 LBS

## 2023-08-04 DIAGNOSIS — M70.21 OLECRANON BURSITIS OF RIGHT ELBOW: Primary | ICD-10-CM

## 2023-08-04 LAB
EKG ATRIAL RATE: 394 BPM
EKG Q-T INTERVAL: 438 MS
EKG QRS DURATION: 146 MS
EKG QTC CALCULATION (BAZETT): 492 MS
EKG R AXIS: -166 DEGREES
EKG T AXIS: 34 DEGREES
EKG VENTRICULAR RATE: 76 BPM

## 2023-08-04 PROCEDURE — 1123F ACP DISCUSS/DSCN MKR DOCD: CPT | Performed by: PHYSICIAN ASSISTANT

## 2023-08-04 PROCEDURE — 99203 OFFICE O/P NEW LOW 30 MIN: CPT | Performed by: PHYSICIAN ASSISTANT

## 2023-08-04 ASSESSMENT — ENCOUNTER SYMPTOMS
DIARRHEA: 0
VOMITING: 0
COLOR CHANGE: 0
CONSTIPATION: 0
SHORTNESS OF BREATH: 0
ABDOMINAL PAIN: 0
NAUSEA: 0
ABDOMINAL DISTENTION: 0
RESPIRATORY NEGATIVE: 1
APNEA: 0
COUGH: 0
CHEST TIGHTNESS: 0

## 2023-08-04 NOTE — PROGRESS NOTES
[address] [facility] SPORTS MEDICINE  [facility] [address]  Dept: [phone]  Dept Fax: [phone]        right Elbow      Subjective:     Chief Complaint   Patient presents with    Elbow Pain     Right      HPI:     Paulie Bolaños is a 78y.o. year old right hand dominant female that has had bruising/swelling in the right Elbow for 1 day. She has minimal pain/tightness with ROM. Patient states that she did have some bruising in that area for the last couple of weeks but just yesterday it swelled up significantly. As far as any trauma to the elbow, the patient indicates none. Patient is on warfarin. Patient was seen at the [facility] emergency department on 8/3/2023 and was diagnosed with either hematoma or bursitis of her right olecranon. Her x-ray was negative for any bony abnormality but did show soft tissue swelling. Her INR was high. They placed an Ace wrap on her elbow and gave her an ED 1 click appointment to our office. The pain is not is the worse at night and when doing activities that involve lifting with the elbow. The pain restricts activities such as none. The pain does not seem to improve with time. The following medications have been tried none. The patient has not had a corticosteroid injection into the elbow. The patient has not tried physical therapy. The patient has not has surgery. The opposite elbow is okay. ROS:   Review of Systems   Constitutional:  Positive for activity change. Negative for appetite change, fatigue and fever. Respiratory: Negative. Negative for apnea, cough, chest tightness and shortness of breath. Cardiovascular: Negative. Negative for chest pain, palpitations and leg swelling. Gastrointestinal:  Negative for abdominal distention, abdominal pain, constipation, diarrhea, nausea and vomiting.    Genitourinary:  Negative for difficulty urinating, dysuria

## 2023-08-25 ENCOUNTER — OFFICE VISIT (OUTPATIENT)
Dept: ORTHOPEDIC SURGERY | Age: 83
End: 2023-08-25

## 2023-08-25 VITALS — HEIGHT: 64 IN | RESPIRATION RATE: 16 BRPM | BODY MASS INDEX: 33.32 KG/M2 | WEIGHT: 195.2 LBS | OXYGEN SATURATION: 98 %

## 2023-08-25 DIAGNOSIS — M70.21 OLECRANON BURSITIS OF RIGHT ELBOW: Primary | ICD-10-CM

## 2023-08-25 ASSESSMENT — ENCOUNTER SYMPTOMS
CHEST TIGHTNESS: 0
RESPIRATORY NEGATIVE: 1
NAUSEA: 0
CONSTIPATION: 0
COUGH: 0
COLOR CHANGE: 0
VOMITING: 0
APNEA: 0
SHORTNESS OF BREATH: 0
DIARRHEA: 0
ABDOMINAL DISTENTION: 0
ABDOMINAL PAIN: 0

## 2023-11-10 ENCOUNTER — HOSPITAL ENCOUNTER (OUTPATIENT)
Facility: CLINIC | Age: 83
Discharge: HOME | End: 2023-11-10
Payer: MEDICARE

## 2023-11-10 PROCEDURE — 80051 ELECTROLYTE PANEL: CPT

## 2023-11-10 PROCEDURE — 82565 ASSAY OF CREATININE: CPT

## 2023-11-10 PROCEDURE — 80048 BASIC METABOLIC PNL TOTAL CA: CPT

## 2023-11-10 PROCEDURE — 84520 ASSAY OF UREA NITROGEN: CPT

## 2023-11-10 PROCEDURE — 83880 ASSAY OF NATRIURETIC PEPTIDE: CPT

## 2023-11-10 PROCEDURE — 36415 COLL VENOUS BLD VENIPUNCTURE: CPT

## 2023-11-11 LAB
ANION GAP SERPL CALCULATED.3IONS-SCNC: 11 MMOL/L (ref 9–17)
ANION GAP SERPL CALCULATED.3IONS-SCNC: 12 MMOL/L (ref 9–17)
BNP SERPL-MCNC: 910 PG/ML
BNP SERPL-MCNC: 916 PG/ML
BUN SERPL-MCNC: 24 MG/DL (ref 8–23)
BUN SERPL-MCNC: 24 MG/DL (ref 8–23)
CALCIUM SERPL-MCNC: 9.7 MG/DL (ref 8.6–10.4)
CHLORIDE SERPL-SCNC: 101 MMOL/L (ref 98–107)
CHLORIDE SERPL-SCNC: 102 MMOL/L (ref 98–107)
CO2 SERPL-SCNC: 23 MMOL/L (ref 20–31)
CO2 SERPL-SCNC: 23 MMOL/L (ref 20–31)
CREAT SERPL-MCNC: 1 MG/DL (ref 0.5–0.9)
CREAT SERPL-MCNC: 1 MG/DL (ref 0.5–0.9)
GFR SERPL CREATININE-BSD FRML MDRD: 55 ML/MIN/1.73M2
GFR SERPL CREATININE-BSD FRML MDRD: 55 ML/MIN/1.73M2
GLUCOSE SERPL-MCNC: 121 MG/DL (ref 70–99)
POTASSIUM SERPL-SCNC: 4.5 MMOL/L (ref 3.7–5.3)
POTASSIUM SERPL-SCNC: 4.5 MMOL/L (ref 3.7–5.3)
SODIUM SERPL-SCNC: 136 MMOL/L (ref 135–144)
SODIUM SERPL-SCNC: 136 MMOL/L (ref 135–144)

## 2024-01-19 ENCOUNTER — HOSPITAL ENCOUNTER (OUTPATIENT)
Facility: CLINIC | Age: 84
Discharge: HOME | End: 2024-01-19
Payer: MEDICARE

## 2024-01-19 PROCEDURE — 84520 ASSAY OF UREA NITROGEN: CPT

## 2024-01-19 PROCEDURE — 80051 ELECTROLYTE PANEL: CPT

## 2024-01-19 PROCEDURE — 82565 ASSAY OF CREATININE: CPT

## 2024-01-19 PROCEDURE — 36415 COLL VENOUS BLD VENIPUNCTURE: CPT

## 2024-01-19 PROCEDURE — 83880 ASSAY OF NATRIURETIC PEPTIDE: CPT

## 2024-01-20 LAB
ANION GAP SERPL CALCULATED.3IONS-SCNC: 7 MMOL/L (ref 9–17)
BNP SERPL-MCNC: 1145 PG/ML
BUN SERPL-MCNC: 27 MG/DL (ref 8–23)
CHLORIDE SERPL-SCNC: 104 MMOL/L (ref 98–107)
CO2 SERPL-SCNC: 27 MMOL/L (ref 20–31)
CREAT SERPL-MCNC: 1 MG/DL (ref 0.5–0.9)
GFR SERPL CREATININE-BSD FRML MDRD: 55 ML/MIN/1.73M2
POTASSIUM SERPL-SCNC: 5.3 MMOL/L (ref 3.7–5.3)
SODIUM SERPL-SCNC: 138 MMOL/L (ref 135–144)
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
## 2024-03-11 ENCOUNTER — HOSPITAL ENCOUNTER (OUTPATIENT)
Facility: CLINIC | Age: 84
Discharge: HOME | End: 2024-03-11
Payer: MEDICARE

## 2024-03-11 ENCOUNTER — HOSPITAL ENCOUNTER (OUTPATIENT)
Facility: CLINIC | Age: 84
End: 2024-03-11
Payer: MEDICARE

## 2024-03-11 LAB
EST. AVERAGE GLUCOSE BLD GHB EST-MCNC: 154 MG/DL
HBA1C MFR BLD: 7 % (ref 4–6)

## 2024-03-11 PROCEDURE — 82043 UR ALBUMIN QUANTITATIVE: CPT

## 2024-03-11 PROCEDURE — 80053 COMPREHEN METABOLIC PANEL: CPT

## 2024-03-11 PROCEDURE — 80048 BASIC METABOLIC PNL TOTAL CA: CPT

## 2024-03-11 PROCEDURE — 83036 HEMOGLOBIN GLYCOSYLATED A1C: CPT

## 2024-03-11 PROCEDURE — 84443 ASSAY THYROID STIM HORMONE: CPT

## 2024-03-11 PROCEDURE — 80061 LIPID PANEL: CPT

## 2024-03-11 PROCEDURE — 36415 COLL VENOUS BLD VENIPUNCTURE: CPT

## 2024-03-11 PROCEDURE — 82570 ASSAY OF URINE CREATININE: CPT

## 2024-03-12 LAB
ALBUMIN SERPL-MCNC: 4 G/DL (ref 3.5–5.2)
ALBUMIN/GLOB SERPL: 1 {RATIO} (ref 1–2.5)
ALP SERPL-CCNC: 72 U/L (ref 35–104)
ALT SERPL-CCNC: 21 U/L (ref 10–35)
ANION GAP SERPL CALCULATED.3IONS-SCNC: 15 MMOL/L (ref 9–16)
AST SERPL-CCNC: 22 U/L (ref 10–35)
BILIRUB SERPL-MCNC: 0.6 MG/DL (ref 0–1.2)
BUN SERPL-MCNC: 24 MG/DL (ref 8–23)
CALCIUM SERPL-MCNC: 9.8 MG/DL (ref 8.6–10.4)
CHLORIDE SERPL-SCNC: 105 MMOL/L (ref 98–107)
CHOLEST SERPL-MCNC: 183 MG/DL (ref 0–199)
CHOLESTEROL/HDL RATIO: 5
CO2 SERPL-SCNC: 22 MMOL/L (ref 20–31)
CREAT SERPL-MCNC: 1 MG/DL (ref 0.5–0.9)
CREAT UR-MCNC: 96 MG/DL (ref 28–217)
GFR SERPL CREATININE-BSD FRML MDRD: 55 ML/MIN/1.73M2
GLUCOSE P FAST SERPL-MCNC: 128 MG/DL (ref 74–99)
HDLC SERPL-MCNC: 38 MG/DL
LDLC SERPL CALC-MCNC: 84 MG/DL (ref 0–100)
MICROALBUMIN UR-MCNC: 15 MG/L (ref 0–20)
MICROALBUMIN/CREAT UR-RTO: NORMAL MCG/MG CREAT (ref 0–25)
POTASSIUM SERPL-SCNC: 4.5 MMOL/L (ref 3.7–5.3)
PROT SERPL-MCNC: 7.6 G/DL (ref 6.6–8.7)
SODIUM SERPL-SCNC: 142 MMOL/L (ref 136–145)
TRIGL SERPL-MCNC: 305 MG/DL (ref 0–149)
TSH SERPL DL<=0.05 MIU/L-ACNC: 2.92 UIU/ML (ref 0.27–4.2)
VLDLC SERPL CALC-MCNC: 61 MG/DL

## 2024-03-13 LAB
ANION GAP SERPL CALCULATED.3IONS-SCNC: 15 MMOL/L (ref 9–17)
BUN SERPL-MCNC: 24 MG/DL (ref 8–23)
CALCIUM SERPL-MCNC: 9.8 MG/DL (ref 8.6–10.4)
CHLORIDE SERPL-SCNC: 105 MMOL/L (ref 98–107)
CO2 SERPL-SCNC: 22 MMOL/L (ref 20–31)
CREAT SERPL-MCNC: 1 MG/DL (ref 0.5–0.9)
GFR SERPL CREATININE-BSD FRML MDRD: 55 ML/MIN/1.73M2
GLUCOSE SERPL-MCNC: 128 MG/DL (ref 70–99)
POTASSIUM SERPL-SCNC: 4.5 MMOL/L (ref 3.7–5.3)
SODIUM SERPL-SCNC: 142 MMOL/L (ref 135–144)

## 2024-05-21 ENCOUNTER — HOSPITAL ENCOUNTER (OUTPATIENT)
Dept: GENERAL RADIOLOGY | Facility: CLINIC | Age: 84
Discharge: HOME | End: 2024-05-23
Payer: MEDICARE

## 2024-05-21 ENCOUNTER — HOSPITAL ENCOUNTER (OUTPATIENT)
Facility: CLINIC | Age: 84
Discharge: HOME | End: 2024-05-23
Payer: MEDICARE

## 2024-05-21 DIAGNOSIS — M20.41 ACQUIRED HAMMER TOE OF RIGHT FOOT: ICD-10-CM

## 2024-05-21 PROCEDURE — 73630 X-RAY EXAM OF FOOT: CPT

## 2024-06-03 ENCOUNTER — HOSPITAL ENCOUNTER (EMERGENCY)
Facility: CLINIC | Age: 84
Discharge: HOME | End: 2024-06-03
Attending: SPECIALIST
Payer: MEDICARE

## 2024-06-03 ENCOUNTER — APPOINTMENT (OUTPATIENT)
Dept: GENERAL RADIOLOGY | Facility: CLINIC | Age: 84
End: 2024-06-03
Payer: MEDICARE

## 2024-06-03 VITALS
BODY MASS INDEX: 30.73 KG/M2 | HEART RATE: 76 BPM | SYSTOLIC BLOOD PRESSURE: 148 MMHG | DIASTOLIC BLOOD PRESSURE: 96 MMHG | RESPIRATION RATE: 16 BRPM | OXYGEN SATURATION: 95 % | TEMPERATURE: 98.3 F | WEIGHT: 180 LBS | HEIGHT: 64 IN

## 2024-06-03 DIAGNOSIS — S30.0XXA CONTUSION OF COCCYX, INITIAL ENCOUNTER: Primary | ICD-10-CM

## 2024-06-03 PROCEDURE — 6370000000 HC RX 637 (ALT 250 FOR IP): Performed by: SPECIALIST

## 2024-06-03 PROCEDURE — 72220 X-RAY EXAM SACRUM TAILBONE: CPT

## 2024-06-03 PROCEDURE — 99283 EMERGENCY DEPT VISIT LOW MDM: CPT

## 2024-06-03 RX ORDER — FUROSEMIDE 80 MG
80 TABLET ORAL 2 TIMES DAILY
COMMUNITY

## 2024-06-03 RX ORDER — TRAMADOL HYDROCHLORIDE 50 MG/1
50 TABLET ORAL EVERY 6 HOURS PRN
Qty: 12 TABLET | Refills: 0 | Status: SHIPPED | OUTPATIENT
Start: 2024-06-03 | End: 2024-06-06

## 2024-06-03 RX ORDER — METOPROLOL SUCCINATE 100 MG/1
100 TABLET, EXTENDED RELEASE ORAL DAILY
COMMUNITY

## 2024-06-03 RX ORDER — TRAMADOL HYDROCHLORIDE 50 MG/1
50 TABLET ORAL ONCE
Status: COMPLETED | OUTPATIENT
Start: 2024-06-03 | End: 2024-06-03

## 2024-06-03 RX ORDER — LOSARTAN POTASSIUM 100 MG/1
100 TABLET ORAL DAILY
COMMUNITY

## 2024-06-03 RX ADMIN — TRAMADOL HYDROCHLORIDE 50 MG: 50 TABLET, COATED ORAL at 22:26

## 2024-06-03 ASSESSMENT — PAIN SCALES - GENERAL
PAINLEVEL_OUTOF10: 8
PAINLEVEL_OUTOF10: 5
PAINLEVEL_OUTOF10: 5

## 2024-06-03 ASSESSMENT — PAIN DESCRIPTION - DESCRIPTORS: DESCRIPTORS: DULL

## 2024-06-03 ASSESSMENT — PAIN DESCRIPTION - LOCATION: LOCATION: OTHER (COMMENT)

## 2024-06-03 ASSESSMENT — PAIN DESCRIPTION - ONSET: ONSET: SUDDEN

## 2024-06-03 ASSESSMENT — PAIN DESCRIPTION - FREQUENCY: FREQUENCY: CONTINUOUS

## 2024-06-03 ASSESSMENT — PAIN DESCRIPTION - PAIN TYPE: TYPE: ACUTE PAIN

## 2024-06-03 ASSESSMENT — PAIN - FUNCTIONAL ASSESSMENT
PAIN_FUNCTIONAL_ASSESSMENT: 0-10
PAIN_FUNCTIONAL_ASSESSMENT: 0-10

## 2024-06-04 ASSESSMENT — ENCOUNTER SYMPTOMS
ABDOMINAL PAIN: 0
BACK PAIN: 0
SORE THROAT: 0

## 2024-06-04 NOTE — ED PROVIDER NOTES
of this note were completed with a voice recognition program.  Efforts were made to edit the dictations but occasionally words are mis-transcribed.)    Kaden Lion MD,, MD, F.A.C.E.P.  Attending Emergency Physician       Kaden Lion MD  06/04/24 0207

## 2024-06-04 NOTE — ED NOTES
Pt presents to ED ambulatory a&O x4. Pt states about 5 days ago she lost her balance and when she was about to fall and tried to move onto the couch to fall. Pt reports that she landed on the wooden part with her tailbone. Pain since incident.

## 2024-06-04 NOTE — DISCHARGE INSTRUCTIONS
Please understand that at this time there is no evidence for a more serious underlying process, but that early in the process of an illness or injury, an emergency department workup can be falsely reassuring.  You should contact your family doctor within the next 48 hours for a follow up appointment    THANK YOU!!!    From [facility] and [facility]    On behalf of the Emergency Department staff at [facility], I would like to thank you for giving us the opportunity to address your health care needs and concerns.    We hope that during your visit, our service was delivered in a professional and caring manner. Please keep [facility] in mind as we walk with you down the path to your own personal wellness.     Please expect an automated text message or email from us so we can ask a few questions about your health and progress. Based on your answers, a clinician may call you back to offer help and instructions.    Please understand that early in the process of an illness or injury, an emergency department workup can be falsely reassuring.  If you notice any worsening, changing or persistent symptoms please call your family doctor or return to the ER immediately.     Tell us how we did during your visit at [email or web address]   and let us know about your experience

## 2024-07-05 ENCOUNTER — HOSPITAL ENCOUNTER (OUTPATIENT)
Facility: CLINIC | Age: 84
Discharge: HOME | End: 2024-07-05
Payer: MEDICARE

## 2024-07-05 LAB
ANION GAP SERPL CALCULATED.3IONS-SCNC: 10 MMOL/L (ref 9–16)
BNP SERPL-MCNC: 996 PG/ML (ref 0–300)
BUN SERPL-MCNC: 19 MG/DL (ref 8–23)
CHLORIDE SERPL-SCNC: 107 MMOL/L (ref 98–107)
CO2 SERPL-SCNC: 23 MMOL/L (ref 20–31)
CREAT SERPL-MCNC: 1 MG/DL (ref 0.5–0.9)
GFR, ESTIMATED: 58 ML/MIN/1.73M2
POTASSIUM SERPL-SCNC: 4.1 MMOL/L (ref 3.7–5.3)
SODIUM SERPL-SCNC: 140 MMOL/L (ref 136–145)

## 2024-07-05 PROCEDURE — 83880 ASSAY OF NATRIURETIC PEPTIDE: CPT

## 2024-07-05 PROCEDURE — 36415 COLL VENOUS BLD VENIPUNCTURE: CPT

## 2024-07-05 PROCEDURE — 80051 ELECTROLYTE PANEL: CPT

## 2024-07-05 PROCEDURE — 82565 ASSAY OF CREATININE: CPT

## 2024-07-05 PROCEDURE — 84520 ASSAY OF UREA NITROGEN: CPT

## 2024-07-24 ENCOUNTER — OFFICE VISIT (OUTPATIENT)
Dept: FAMILY MEDICINE CLINIC | Age: 84
End: 2024-07-24

## 2024-07-24 VITALS
BODY MASS INDEX: 33.99 KG/M2 | TEMPERATURE: 97 F | HEART RATE: 70 BPM | HEIGHT: 61 IN | WEIGHT: 180 LBS | DIASTOLIC BLOOD PRESSURE: 82 MMHG | OXYGEN SATURATION: 96 % | SYSTOLIC BLOOD PRESSURE: 137 MMHG

## 2024-07-24 DIAGNOSIS — E55.9 VITAMIN D DEFICIENCY: ICD-10-CM

## 2024-07-24 DIAGNOSIS — E78.5 DYSLIPIDEMIA: Primary | ICD-10-CM

## 2024-07-24 DIAGNOSIS — I25.10 CORONARY ARTERY DISEASE INVOLVING NATIVE CORONARY ARTERY OF NATIVE HEART WITHOUT ANGINA PECTORIS: ICD-10-CM

## 2024-07-24 DIAGNOSIS — R73.03 PRE-DIABETES: ICD-10-CM

## 2024-07-24 DIAGNOSIS — G89.29 CHRONIC LEFT-SIDED LOW BACK PAIN WITHOUT SCIATICA: ICD-10-CM

## 2024-07-24 DIAGNOSIS — Z95.0 STATUS POST CARDIAC PACEMAKER PROCEDURE: ICD-10-CM

## 2024-07-24 DIAGNOSIS — E53.9 VITAMIN B DEFICIENCY: ICD-10-CM

## 2024-07-24 DIAGNOSIS — I48.20 CHRONIC ATRIAL FIBRILLATION (HCC): ICD-10-CM

## 2024-07-24 DIAGNOSIS — Z79.01 ANTICOAGULANT LONG-TERM USE: ICD-10-CM

## 2024-07-24 DIAGNOSIS — I10 ESSENTIAL HYPERTENSION: ICD-10-CM

## 2024-07-24 DIAGNOSIS — M19.042 PRIMARY OSTEOARTHRITIS OF BOTH HANDS: ICD-10-CM

## 2024-07-24 DIAGNOSIS — E79.0 HYPERURICEMIA: ICD-10-CM

## 2024-07-24 DIAGNOSIS — M25.552 PAIN OF LEFT HIP: ICD-10-CM

## 2024-07-24 DIAGNOSIS — K21.9 GASTROESOPHAGEAL REFLUX DISEASE, UNSPECIFIED WHETHER ESOPHAGITIS PRESENT: ICD-10-CM

## 2024-07-24 DIAGNOSIS — E66.9 CLASS 1 OBESITY WITH BODY MASS INDEX (BMI) OF 34.0 TO 34.9 IN ADULT, UNSPECIFIED OBESITY TYPE, UNSPECIFIED WHETHER SERIOUS COMORBIDITY PRESENT: ICD-10-CM

## 2024-07-24 DIAGNOSIS — M81.0 SENILE OSTEOPOROSIS: ICD-10-CM

## 2024-07-24 DIAGNOSIS — E11.69 TYPE 2 DIABETES MELLITUS WITH OTHER SPECIFIED COMPLICATION, WITHOUT LONG-TERM CURRENT USE OF INSULIN (HCC): ICD-10-CM

## 2024-07-24 DIAGNOSIS — M54.50 CHRONIC LEFT-SIDED LOW BACK PAIN WITHOUT SCIATICA: ICD-10-CM

## 2024-07-24 DIAGNOSIS — M19.041 PRIMARY OSTEOARTHRITIS OF BOTH HANDS: ICD-10-CM

## 2024-07-24 PROBLEM — E11.9 TYPE 2 DIABETES MELLITUS, WITHOUT LONG-TERM CURRENT USE OF INSULIN (HCC): Status: ACTIVE | Noted: 2024-07-24

## 2024-07-24 PROBLEM — B34.9 ACUTE VIRAL SYNDROME: Status: RESOLVED | Noted: 2019-10-03 | Resolved: 2024-07-24

## 2024-07-24 PROBLEM — D68.9 COAGULOPATHY (HCC): Status: RESOLVED | Noted: 2017-12-14 | Resolved: 2024-07-24

## 2024-07-24 LAB
ESTIMATED AVERAGE GLUCOSE: NORMAL
HBA1C MFR BLD: 6.5 %

## 2024-07-24 ASSESSMENT — ENCOUNTER SYMPTOMS
STRIDOR: 0
CONSTIPATION: 0
EYE REDNESS: 0
PHOTOPHOBIA: 0
VOMITING: 0
BLOOD IN STOOL: 0
SHORTNESS OF BREATH: 0
EYE PAIN: 0
NAUSEA: 0
COUGH: 0
BACK PAIN: 0
EYE DISCHARGE: 0
SORE THROAT: 0
DIARRHEA: 0
ABDOMINAL PAIN: 0

## 2024-07-24 NOTE — PROGRESS NOTES
Subjective:      Patient ID: Dejah Schreiber is a 84 y.o. female.    Here for a NP appt.  Has H/O diabetes, HTN, Dyslipidemia, GERD, chronic pain from osteoarthritis as well as Obesity and   Chronic afib on anticoagulation.  Last labs were reviewed- Lipids and thyroid are ok, CBC stable, BMP ok as well.  She is on long term coumadin- monitored by cardiology. Dr Musa as well as [facility] has placed pacemaker .  States wanted to change PCP as she was always sent to ER for any acute problems.  On tramadol for pain in her hands- states hands are numb all the time and then in winter painful when hands get cold. State smost of tramadol she takes is for pain left groin x last few years.      Review of Systems   Constitutional:  Negative for chills and fever.   HENT:  Negative for congestion, ear pain, hearing loss, nosebleeds, sore throat and tinnitus.    Eyes:  Negative for photophobia, pain, discharge and redness.   Respiratory:  Negative for cough, shortness of breath and stridor.    Cardiovascular:  Negative for chest pain, palpitations and leg swelling.   Gastrointestinal:  Negative for abdominal pain, blood in stool, constipation, diarrhea, nausea and vomiting.   Endocrine: Negative for polydipsia.   Genitourinary:  Negative for dysuria, flank pain, frequency, hematuria and urgency.   Musculoskeletal:  Positive for arthralgias and myalgias. Negative for back pain and neck pain.   Skin:  Negative for rash.   Allergic/Immunologic: Negative for environmental allergies.   Neurological:  Negative for dizziness, tremors, seizures, weakness and headaches.   Hematological:  Does not bruise/bleed easily.   Psychiatric/Behavioral:  Negative for hallucinations and suicidal ideas. The patient is not nervous/anxious.      Objective:   Physical Exam  Constitutional:       General: She is not in acute distress.     Appearance: She is well-developed.   HENT:      Head: Normocephalic and atraumatic.      Right Ear: External ear normal.

## 2024-08-06 ENCOUNTER — HOSPITAL ENCOUNTER (OUTPATIENT)
Dept: GENERAL RADIOLOGY | Facility: CLINIC | Age: 84
Discharge: HOME | End: 2024-08-08
Attending: FAMILY MEDICINE
Payer: MEDICARE

## 2024-08-06 ENCOUNTER — HOSPITAL ENCOUNTER (OUTPATIENT)
Dept: MAMMOGRAPHY | Facility: CLINIC | Age: 84
Discharge: HOME | End: 2024-08-08
Attending: FAMILY MEDICINE
Payer: MEDICARE

## 2024-08-06 ENCOUNTER — HOSPITAL ENCOUNTER (OUTPATIENT)
Facility: CLINIC | Age: 84
Discharge: HOME | End: 2024-08-06
Payer: MEDICARE

## 2024-08-06 ENCOUNTER — HOSPITAL ENCOUNTER (OUTPATIENT)
Facility: CLINIC | Age: 84
End: 2024-08-06
Attending: FAMILY MEDICINE
Payer: MEDICARE

## 2024-08-06 ENCOUNTER — HOSPITAL ENCOUNTER (OUTPATIENT)
Facility: CLINIC | Age: 84
Discharge: HOME | End: 2024-08-08

## 2024-08-06 DIAGNOSIS — M25.552 PAIN OF LEFT HIP: ICD-10-CM

## 2024-08-06 DIAGNOSIS — M25.551 BILATERAL HIP PAIN: ICD-10-CM

## 2024-08-06 DIAGNOSIS — E66.9 CLASS 1 OBESITY WITH BODY MASS INDEX (BMI) OF 34.0 TO 34.9 IN ADULT, UNSPECIFIED OBESITY TYPE, UNSPECIFIED WHETHER SERIOUS COMORBIDITY PRESENT: ICD-10-CM

## 2024-08-06 DIAGNOSIS — E55.9 VITAMIN D DEFICIENCY: ICD-10-CM

## 2024-08-06 DIAGNOSIS — M81.0 SENILE OSTEOPOROSIS: ICD-10-CM

## 2024-08-06 DIAGNOSIS — G89.29 CHRONIC LEFT-SIDED LOW BACK PAIN WITHOUT SCIATICA: ICD-10-CM

## 2024-08-06 DIAGNOSIS — M54.50 CHRONIC LEFT-SIDED LOW BACK PAIN WITHOUT SCIATICA: ICD-10-CM

## 2024-08-06 DIAGNOSIS — E11.69 TYPE 2 DIABETES MELLITUS WITH OTHER SPECIFIED COMPLICATION, WITHOUT LONG-TERM CURRENT USE OF INSULIN (HCC): ICD-10-CM

## 2024-08-06 DIAGNOSIS — E53.9 VITAMIN B DEFICIENCY: ICD-10-CM

## 2024-08-06 DIAGNOSIS — M25.552 BILATERAL HIP PAIN: ICD-10-CM

## 2024-08-06 PROBLEM — R80.9 MICROALBUMINURIA: Status: ACTIVE | Noted: 2024-08-06

## 2024-08-06 LAB
25(OH)D3 SERPL-MCNC: 46.5 NG/ML (ref 30–100)
CREAT UR-MCNC: 34.5 MG/DL (ref 28–217)
FOLATE SERPL-MCNC: 15.4 NG/ML (ref 4.8–24.2)
MICROALBUMIN UR-MCNC: 29 MG/L (ref 0–20)
MICROALBUMIN/CREAT UR-RTO: 84 MCG/MG CREAT (ref 0–25)
VIT B12 SERPL-MCNC: 903 PG/ML (ref 232–1245)

## 2024-08-06 PROCEDURE — 83527 ASSAY OF INSULIN: CPT

## 2024-08-06 PROCEDURE — 73502 X-RAY EXAM HIP UNI 2-3 VIEWS: CPT

## 2024-08-06 PROCEDURE — 72110 X-RAY EXAM L-2 SPINE 4/>VWS: CPT

## 2024-08-06 PROCEDURE — 82306 VITAMIN D 25 HYDROXY: CPT

## 2024-08-06 PROCEDURE — 77080 DXA BONE DENSITY AXIAL: CPT

## 2024-08-06 PROCEDURE — 36415 COLL VENOUS BLD VENIPUNCTURE: CPT

## 2024-08-06 PROCEDURE — 82607 VITAMIN B-12: CPT

## 2024-08-06 PROCEDURE — 82570 ASSAY OF URINE CREATININE: CPT

## 2024-08-06 PROCEDURE — 82746 ASSAY OF FOLIC ACID SERUM: CPT

## 2024-08-06 PROCEDURE — 83525 ASSAY OF INSULIN: CPT

## 2024-08-06 PROCEDURE — 82043 UR ALBUMIN QUANTITATIVE: CPT

## 2024-08-08 LAB
INSULIN FREE: 13 UIU/ML (ref 3–25)
INSULIN: 20 UIU/ML (ref 3–25)

## 2024-08-09 ENCOUNTER — OFFICE VISIT (OUTPATIENT)
Dept: FAMILY MEDICINE CLINIC | Age: 84
End: 2024-08-09
Payer: MEDICARE

## 2024-08-09 VITALS
HEART RATE: 70 BPM | SYSTOLIC BLOOD PRESSURE: 123 MMHG | HEIGHT: 60 IN | BODY MASS INDEX: 35.3 KG/M2 | DIASTOLIC BLOOD PRESSURE: 83 MMHG | OXYGEN SATURATION: 95 % | TEMPERATURE: 97 F | WEIGHT: 179.8 LBS

## 2024-08-09 DIAGNOSIS — I48.20 CHRONIC ATRIAL FIBRILLATION (HCC): ICD-10-CM

## 2024-08-09 DIAGNOSIS — D68.32 WARFARIN-INDUCED COAGULOPATHY (HCC): ICD-10-CM

## 2024-08-09 DIAGNOSIS — R10.31 RIGHT LOWER QUADRANT ABDOMINAL PAIN: Primary | ICD-10-CM

## 2024-08-09 DIAGNOSIS — T45.515A WARFARIN-INDUCED COAGULOPATHY (HCC): ICD-10-CM

## 2024-08-09 DIAGNOSIS — Z90.49 HISTORY OF APPENDECTOMY: ICD-10-CM

## 2024-08-09 DIAGNOSIS — E11.69 TYPE 2 DIABETES MELLITUS WITH OTHER SPECIFIED COMPLICATION, WITHOUT LONG-TERM CURRENT USE OF INSULIN (HCC): ICD-10-CM

## 2024-08-09 DIAGNOSIS — I10 ESSENTIAL HYPERTENSION: ICD-10-CM

## 2024-08-09 PROCEDURE — 1123F ACP DISCUSS/DSCN MKR DOCD: CPT | Performed by: FAMILY MEDICINE

## 2024-08-09 PROCEDURE — 3044F HG A1C LEVEL LT 7.0%: CPT | Performed by: FAMILY MEDICINE

## 2024-08-09 PROCEDURE — 99213 OFFICE O/P EST LOW 20 MIN: CPT | Performed by: FAMILY MEDICINE

## 2024-08-09 ASSESSMENT — ENCOUNTER SYMPTOMS
COUGH: 0
SHORTNESS OF BREATH: 0
EYE PAIN: 0
NAUSEA: 0
STRIDOR: 0
EYE DISCHARGE: 0
DIARRHEA: 0
ABDOMINAL PAIN: 0
EYE REDNESS: 0
PHOTOPHOBIA: 0
SORE THROAT: 0
BACK PAIN: 0
VOMITING: 0
CONSTIPATION: 0
BLOOD IN STOOL: 0

## 2024-08-09 ASSESSMENT — PATIENT HEALTH QUESTIONNAIRE - PHQ9
2. FEELING DOWN, DEPRESSED OR HOPELESS: NOT AT ALL
1. LITTLE INTEREST OR PLEASURE IN DOING THINGS: NOT AT ALL
SUM OF ALL RESPONSES TO PHQ QUESTIONS 1-9: 0
SUM OF ALL RESPONSES TO PHQ9 QUESTIONS 1 & 2: 0

## 2024-08-09 NOTE — PROGRESS NOTES
Subjective:      Patient ID: Dejah Schreiber is a 84 y.o. female.    Pain in right lower abdo on and off x 3 days - states took one tramadol yesterday 400 pm and  pain was gone till this am. Usually no triggers - sates comes on and stays on and was   radiating to left side of abdo. No N/V/D - regular BM this am. No fever, chills , sweats, no urinary sx.  No other sx - sleep, mood, anxiety are all ok.  Made a chicken paprikas- [place] food - lots of onions and sour cream- ate this for 3 days- may have   Something to do with her sx.    Review of Systems   Constitutional:  Negative for chills and fever.   HENT:  Negative for congestion, ear pain, hearing loss, nosebleeds, sore throat and tinnitus.    Eyes:  Negative for photophobia, pain, discharge and redness.   Respiratory:  Negative for cough, shortness of breath and stridor.    Cardiovascular:  Negative for chest pain, palpitations and leg swelling.   Gastrointestinal:  Negative for abdominal pain, blood in stool, constipation, diarrhea, nausea and vomiting.   Endocrine: Negative for polydipsia.   Genitourinary:  Negative for dysuria, flank pain, frequency, hematuria and urgency.   Musculoskeletal:  Negative for back pain, myalgias and neck pain.   Skin:  Negative for rash.   Allergic/Immunologic: Negative for environmental allergies.   Neurological:  Negative for dizziness, tremors, seizures, weakness and headaches.   Hematological:  Does not bruise/bleed easily.   Psychiatric/Behavioral:  Negative for hallucinations and suicidal ideas. The patient is not nervous/anxious.      Objective:   Physical Exam  Constitutional:       Appearance: She is well-developed.   HENT:      Head: Normocephalic and atraumatic.      Right Ear: External ear normal.      Left Ear: External ear normal.      Nose: Nose normal.      Mouth/Throat:      Pharynx: No oropharyngeal exudate.   Eyes:      General: No scleral icterus.        Right eye: No discharge.         Left eye: No

## 2024-08-09 NOTE — PATIENT INSTRUCTIONS
Thank you for choosing [facility].  We know you have options when it comes to your healthcare; we appreciate that you chose us. Our goal is to provide exceptional  service and world class care to every patient.  You will be receiving a survey via email or text message asking for your feedback.  Please take a few minutes to share your thoughts about your recent visit. Your comments helps us understand what we do well and ways we can improve.  Thank you in advance for your valuable feedback.

## 2024-08-27 ENCOUNTER — OFFICE VISIT (OUTPATIENT)
Dept: FAMILY MEDICINE CLINIC | Age: 84
End: 2024-08-27
Payer: MEDICARE

## 2024-08-27 VITALS
WEIGHT: 178.2 LBS | BODY MASS INDEX: 34.99 KG/M2 | DIASTOLIC BLOOD PRESSURE: 75 MMHG | OXYGEN SATURATION: 94 % | HEIGHT: 60 IN | SYSTOLIC BLOOD PRESSURE: 127 MMHG | TEMPERATURE: 97 F | HEART RATE: 70 BPM

## 2024-08-27 DIAGNOSIS — E11.69 TYPE 2 DIABETES MELLITUS WITH OTHER SPECIFIED COMPLICATION, WITHOUT LONG-TERM CURRENT USE OF INSULIN (HCC): ICD-10-CM

## 2024-08-27 DIAGNOSIS — E78.5 DYSLIPIDEMIA: ICD-10-CM

## 2024-08-27 DIAGNOSIS — E79.0 HYPERURICEMIA: ICD-10-CM

## 2024-08-27 DIAGNOSIS — I10 ESSENTIAL HYPERTENSION: ICD-10-CM

## 2024-08-27 DIAGNOSIS — I48.20 CHRONIC ATRIAL FIBRILLATION (HCC): ICD-10-CM

## 2024-08-27 DIAGNOSIS — Z79.01 ANTICOAGULANT LONG-TERM USE: Primary | ICD-10-CM

## 2024-08-27 DIAGNOSIS — I25.10 CORONARY ARTERY DISEASE INVOLVING NATIVE CORONARY ARTERY OF NATIVE HEART WITHOUT ANGINA PECTORIS: ICD-10-CM

## 2024-08-27 DIAGNOSIS — M81.0 AGE-RELATED OSTEOPOROSIS WITHOUT CURRENT PATHOLOGICAL FRACTURE: ICD-10-CM

## 2024-08-27 DIAGNOSIS — K21.9 GASTROESOPHAGEAL REFLUX DISEASE, UNSPECIFIED WHETHER ESOPHAGITIS PRESENT: ICD-10-CM

## 2024-08-27 PROCEDURE — G0439 PPPS, SUBSEQ VISIT: HCPCS | Performed by: FAMILY MEDICINE

## 2024-08-27 PROCEDURE — 99213 OFFICE O/P EST LOW 20 MIN: CPT | Performed by: FAMILY MEDICINE

## 2024-08-27 PROCEDURE — 1123F ACP DISCUSS/DSCN MKR DOCD: CPT | Performed by: FAMILY MEDICINE

## 2024-08-27 PROCEDURE — 3044F HG A1C LEVEL LT 7.0%: CPT | Performed by: FAMILY MEDICINE

## 2024-08-27 RX ORDER — EMPAGLIFLOZIN 25 MG/1
TABLET, FILM COATED ORAL
COMMUNITY
Start: 2024-08-26

## 2024-08-27 RX ORDER — SPIRONOLACTONE 25 MG/1
12.5 TABLET ORAL
COMMUNITY
Start: 2024-08-20 | End: 2025-11-13

## 2024-08-27 ASSESSMENT — ANXIETY QUESTIONNAIRES
3. WORRYING TOO MUCH ABOUT DIFFERENT THINGS: NOT AT ALL
6. BECOMING EASILY ANNOYED OR IRRITABLE: NOT AT ALL
4. TROUBLE RELAXING: NOT AT ALL
1. FEELING NERVOUS, ANXIOUS, OR ON EDGE: NOT AT ALL
5. BEING SO RESTLESS THAT IT IS HARD TO SIT STILL: NOT AT ALL
7. FEELING AFRAID AS IF SOMETHING AWFUL MIGHT HAPPEN: NOT AT ALL
2. NOT BEING ABLE TO STOP OR CONTROL WORRYING: NOT AT ALL
IF YOU CHECKED OFF ANY PROBLEMS ON THIS QUESTIONNAIRE, HOW DIFFICULT HAVE THESE PROBLEMS MADE IT FOR YOU TO DO YOUR WORK, TAKE CARE OF THINGS AT HOME, OR GET ALONG WITH OTHER PEOPLE: NOT DIFFICULT AT ALL
GAD7 TOTAL SCORE: 0

## 2024-08-27 ASSESSMENT — PATIENT HEALTH QUESTIONNAIRE - PHQ9
SUM OF ALL RESPONSES TO PHQ QUESTIONS 1-9: 0
1. LITTLE INTEREST OR PLEASURE IN DOING THINGS: NOT AT ALL
SUM OF ALL RESPONSES TO PHQ QUESTIONS 1-9: 0
SUM OF ALL RESPONSES TO PHQ QUESTIONS 1-9: 0
SUM OF ALL RESPONSES TO PHQ9 QUESTIONS 1 & 2: 0
1. LITTLE INTEREST OR PLEASURE IN DOING THINGS: NOT AT ALL
SUM OF ALL RESPONSES TO PHQ QUESTIONS 1-9: 0
2. FEELING DOWN, DEPRESSED OR HOPELESS: NOT AT ALL
SUM OF ALL RESPONSES TO PHQ QUESTIONS 1-9: 0
SUM OF ALL RESPONSES TO PHQ QUESTIONS 1-9: 0
SUM OF ALL RESPONSES TO PHQ9 QUESTIONS 1 & 2: 0
2. FEELING DOWN, DEPRESSED OR HOPELESS: NOT AT ALL
SUM OF ALL RESPONSES TO PHQ QUESTIONS 1-9: 0
SUM OF ALL RESPONSES TO PHQ QUESTIONS 1-9: 0

## 2024-08-27 ASSESSMENT — LIFESTYLE VARIABLES
HOW OFTEN DO YOU HAVE A DRINK CONTAINING ALCOHOL: 2-4 TIMES A MONTH
HOW MANY STANDARD DRINKS CONTAINING ALCOHOL DO YOU HAVE ON A TYPICAL DAY: 1 OR 2

## 2024-08-27 NOTE — PATIENT INSTRUCTIONS
Thank you for choosing [facility].  We know you have options when it comes to your healthcare; we appreciate that you chose us. Our goal is to provide exceptional  service and world class care to every patient.  You will be receiving a survey via email or text message asking for your feedback.  Please take a few minutes to share your thoughts about your recent visit. Your comments helps us understand what we do well and ways we can improve.  Thank you in advance for your valuable feedback.

## 2024-08-27 NOTE — PROGRESS NOTES
Medicare Annual Wellness Visit    Dejah Schreiber is here for Medicare AWV    Assessment & Plan   AWV  Recommendations for Preventive Services Due: see orders and patient instructions/AVS.  Recommended screening schedule for the next 5-10 years is provided to the patient in written form: see Patient Instructions/AVS.     No follow-ups on file.     Subjective   The following acute and/or chronic problems were also addressed today:  Diabetes, HTN, Chronic Afib and anticoagulation  Xrys reviewed and labs- BS ok, renal function ok  DEXA shows osteoporosis- will start Prolia at [facility]  Patient's complete Health Risk Assessment and screening values have been reviewed and are found in Flowsheets. The following problems were reviewed today and where indicated follow up appointments were made and/or referrals ordered.    Positive Risk Factor Screenings with Interventions:               Inactivity:  On average, how many days per week do you engage in moderate to strenuous exercise (like a brisk walk)?: 0 days (!) Abnormal  On average, how many minutes do you engage in exercise at this level?: 0 min    Interventions:  Patient declined any further interventions or treatment     Abnormal BMI (obese):  Body mass index is 34.8 kg/m². (!) Abnormal    Interventions:  Patient declines any further evaluation or treatment    Obesity Counseling: Patient was asked about her current diet and exercise habits, and personalized advice was provided regarding recommended lifestyle changes. Patient's comorbid health conditions associated with elevated BMI were discussed, as well as the likely benefits of weight loss. Based upon patient's motivation to change her behavior, the following plan was agreed upon to work toward a weight loss goal of 15 pounds: lower carbohydrate diet and increase physical activity, as tolerated. Educational materials for weight loss were provided.  Patient will follow-up in 3 month(s) with PCP. Time spent counseling

## 2024-09-01 ENCOUNTER — APPOINTMENT (OUTPATIENT)
Dept: CT IMAGING | Age: 84
End: 2024-09-01
Payer: MEDICARE

## 2024-09-01 ENCOUNTER — APPOINTMENT (OUTPATIENT)
Dept: GENERAL RADIOLOGY | Age: 84
End: 2024-09-01
Payer: MEDICARE

## 2024-09-01 ENCOUNTER — HOSPITAL ENCOUNTER (EMERGENCY)
Age: 84
Discharge: HOME | End: 2024-09-02
Attending: STUDENT IN AN ORGANIZED HEALTH CARE EDUCATION/TRAINING PROGRAM
Payer: MEDICARE

## 2024-09-01 VITALS
OXYGEN SATURATION: 93 % | BODY MASS INDEX: 34.95 KG/M2 | SYSTOLIC BLOOD PRESSURE: 110 MMHG | HEIGHT: 60 IN | RESPIRATION RATE: 18 BRPM | WEIGHT: 178 LBS | TEMPERATURE: 97.3 F | DIASTOLIC BLOOD PRESSURE: 60 MMHG | HEART RATE: 70 BPM

## 2024-09-01 DIAGNOSIS — I50.9 ACUTE ON CHRONIC CONGESTIVE HEART FAILURE, UNSPECIFIED HEART FAILURE TYPE (HCC): Primary | ICD-10-CM

## 2024-09-01 DIAGNOSIS — R79.1 SUBTHERAPEUTIC INTERNATIONAL NORMALIZED RATIO (INR): ICD-10-CM

## 2024-09-01 LAB
ANION GAP SERPL CALCULATED.3IONS-SCNC: 11 MMOL/L (ref 9–17)
BASOPHILS # BLD: <0.03 K/UL (ref 0–0.2)
BASOPHILS NFR BLD: 0 % (ref 0–2)
BNP SERPL-MCNC: 1031 PG/ML
BUN SERPL-MCNC: 18 MG/DL (ref 8–23)
BUN/CREAT SERPL: 18 (ref 9–20)
CALCIUM SERPL-MCNC: 9.8 MG/DL (ref 8.6–10.4)
CHLORIDE SERPL-SCNC: 100 MMOL/L (ref 98–107)
CO2 SERPL-SCNC: 25 MMOL/L (ref 20–31)
CREAT SERPL-MCNC: 1 MG/DL (ref 0.5–0.9)
EOSINOPHIL # BLD: 0.07 K/UL (ref 0–0.44)
EOSINOPHILS RELATIVE PERCENT: 1 % (ref 1–4)
ERYTHROCYTE [DISTWIDTH] IN BLOOD BY AUTOMATED COUNT: 15.8 % (ref 11.8–14.4)
GFR, ESTIMATED: 55 ML/MIN/1.73M2
GLUCOSE SERPL-MCNC: 147 MG/DL (ref 70–99)
HCT VFR BLD AUTO: 50.6 % (ref 36.3–47.1)
HGB BLD-MCNC: 16.5 G/DL (ref 11.9–15.1)
IMM GRANULOCYTES # BLD AUTO: 0.01 K/UL (ref 0–0.3)
IMM GRANULOCYTES NFR BLD: 0 %
INR PPP: 1.7
LYMPHOCYTES NFR BLD: 1.76 K/UL (ref 1.1–3.7)
LYMPHOCYTES RELATIVE PERCENT: 28 % (ref 24–43)
MCH RBC QN AUTO: 31 PG (ref 25.2–33.5)
MCHC RBC AUTO-ENTMCNC: 32.6 G/DL (ref 28.4–34.8)
MCV RBC AUTO: 95.1 FL (ref 82.6–102.9)
MONOCYTES NFR BLD: 0.42 K/UL (ref 0.1–1.2)
MONOCYTES NFR BLD: 7 % (ref 3–12)
MYOGLOBIN SERPL-MCNC: 42 NG/ML (ref 25–58)
NEUTROPHILS NFR BLD: 64 % (ref 36–65)
NEUTS SEG NFR BLD: 3.91 K/UL (ref 1.5–8.1)
NRBC BLD-RTO: 0 PER 100 WBC
PLATELET # BLD AUTO: 199 K/UL (ref 138–453)
PMV BLD AUTO: 10.1 FL (ref 8.1–13.5)
POTASSIUM SERPL-SCNC: 4 MMOL/L (ref 3.7–5.3)
PROTHROMBIN TIME: 20.2 SEC (ref 11.5–14.2)
RBC # BLD AUTO: 5.32 M/UL (ref 3.95–5.11)
RBC # BLD: ABNORMAL 10*6/UL
SODIUM SERPL-SCNC: 136 MMOL/L (ref 135–144)
TROPONIN I SERPL HS-MCNC: 10 NG/L (ref 0–14)
WBC OTHER # BLD: 6.2 K/UL (ref 3.5–11.3)

## 2024-09-01 PROCEDURE — 6360000004 HC RX CONTRAST MEDICATION: Performed by: STUDENT IN AN ORGANIZED HEALTH CARE EDUCATION/TRAINING PROGRAM

## 2024-09-01 PROCEDURE — 70498 CT ANGIOGRAPHY NECK: CPT

## 2024-09-01 PROCEDURE — 83874 ASSAY OF MYOGLOBIN: CPT

## 2024-09-01 PROCEDURE — 6360000002 HC RX W HCPCS: Performed by: STUDENT IN AN ORGANIZED HEALTH CARE EDUCATION/TRAINING PROGRAM

## 2024-09-01 PROCEDURE — 96372 THER/PROPH/DIAG INJ SC/IM: CPT

## 2024-09-01 PROCEDURE — 80048 BASIC METABOLIC PNL TOTAL CA: CPT

## 2024-09-01 PROCEDURE — 70450 CT HEAD/BRAIN W/O DYE: CPT

## 2024-09-01 PROCEDURE — 85610 PROTHROMBIN TIME: CPT

## 2024-09-01 PROCEDURE — 99285 EMERGENCY DEPT VISIT HI MDM: CPT

## 2024-09-01 PROCEDURE — 85025 COMPLETE CBC W/AUTO DIFF WBC: CPT

## 2024-09-01 PROCEDURE — 83880 ASSAY OF NATRIURETIC PEPTIDE: CPT

## 2024-09-01 PROCEDURE — 84484 ASSAY OF TROPONIN QUANT: CPT

## 2024-09-01 PROCEDURE — 2580000003 HC RX 258: Performed by: STUDENT IN AN ORGANIZED HEALTH CARE EDUCATION/TRAINING PROGRAM

## 2024-09-01 PROCEDURE — 93005 ELECTROCARDIOGRAM TRACING: CPT | Performed by: NURSE PRACTITIONER

## 2024-09-01 PROCEDURE — 71045 X-RAY EXAM CHEST 1 VIEW: CPT

## 2024-09-01 RX ORDER — IOPAMIDOL 755 MG/ML
75 INJECTION, SOLUTION INTRAVASCULAR
Status: COMPLETED | OUTPATIENT
Start: 2024-09-01 | End: 2024-09-01

## 2024-09-01 RX ORDER — ENOXAPARIN SODIUM 100 MG/ML
1 INJECTION SUBCUTANEOUS ONCE
Status: COMPLETED | OUTPATIENT
Start: 2024-09-01 | End: 2024-09-01

## 2024-09-01 RX ORDER — SODIUM CHLORIDE 0.9 % (FLUSH) 0.9 %
10 SYRINGE (ML) INJECTION PRN
Status: DISCONTINUED | OUTPATIENT
Start: 2024-09-01 | End: 2024-09-02 | Stop reason: HOSPADM

## 2024-09-01 RX ORDER — 0.9 % SODIUM CHLORIDE 0.9 %
80 INTRAVENOUS SOLUTION INTRAVENOUS ONCE
Status: COMPLETED | OUTPATIENT
Start: 2024-09-01 | End: 2024-09-01

## 2024-09-01 RX ADMIN — IOPAMIDOL 75 ML: 755 INJECTION, SOLUTION INTRAVENOUS at 23:35

## 2024-09-01 RX ADMIN — SODIUM CHLORIDE 80 ML: 9 INJECTION, SOLUTION INTRAVENOUS at 23:35

## 2024-09-01 RX ADMIN — ENOXAPARIN SODIUM 80 MG: 100 INJECTION SUBCUTANEOUS at 23:57

## 2024-09-01 RX ADMIN — SODIUM CHLORIDE, PRESERVATIVE FREE 10 ML: 5 INJECTION INTRAVENOUS at 23:35

## 2024-09-01 ASSESSMENT — PAIN - FUNCTIONAL ASSESSMENT: PAIN_FUNCTIONAL_ASSESSMENT: NONE - DENIES PAIN

## 2024-09-02 NOTE — ED TRIAGE NOTES
Was at [facility] for pacemaker adjustment on Wednesday. Began having shortness of breath on Friday that has been worsening. Is also experiencing extremity weakness and fatigue.

## 2024-09-02 NOTE — DISCHARGE INSTRUCTIONS
As discussed please take up to 2 extra Lasix tablets as discussed Monday.    Call Coumadin clinic Tuesday to adjust your Coumadin level.    Take your medication as indicated and prescribed.  Make sure you take your water pills as previously indicated.  Avoid eating foods that are rich in salt (sodium).  If you use salt substitutes, be careful with the amount of extra potassium that you take in.    PLEASE RETURN TO THE EMERGENCY DEPARTMENT IMMEDIATELY for worsening symptoms of increasing pain, shortness of breath, feeling of your heart fluttering or racing, swelling to your feet, unable to lay flat or increase in the number of pillows you sleep on, or if you develop any concerning symptoms such as: high fever not relieved by acetaminophen (Tylenol) and/or ibuprofen (Motrin / Advil), chills, persistent nausea and/or vomiting, loss of consciousness, numbness, weakness or tingling in the arms or legs or change in color of the extremities, changes in mental status, persistent headache, blurry vision, loss of bladder / bowel control, unable to follow up with your physician, or other any other care or concern.

## 2024-09-03 LAB
EKG ATRIAL RATE: 70 BPM
EKG Q-T INTERVAL: 470 MS
EKG QRS DURATION: 210 MS
EKG QTC CALCULATION (BAZETT): 507 MS
EKG R AXIS: 16 DEGREES
EKG T AXIS: 143 DEGREES
EKG VENTRICULAR RATE: 70 BPM

## 2024-09-03 NOTE — ED PROVIDER NOTES
EMERGENCY DEPARTMENT ENCOUNTER   ATTENDING ATTESTATION     Pt Name: Dejah Schreiber  MRN: [id number]  Birthdate 1940  Date of evaluation: 9/3/24   Dejah Schreiber is a 84 y.o. female with CC: Shortness of Breath (Started a few days ago, worse since yesterday) and Extremity Weakness    MDM:   Patient thinks urinary pacemaker adjusted insulin at [facility] and then has been more fatigued and shortness of breath.  Workup appears to show some slightly worsening fluid status.  No oxygen requirement.  He agreeable to discharge.  Discussed taking extra Lasix tomorrow a.m. due to diuresis.    CRITICAL CARE:   0    EKG: All EKG's are interpreted by the Emergency Department Physician who either signs or Co-signs this chart in the absence of a cardiologist.  Rhythm: paced  Rate: normal  Axis: left  Ectopy: none  Conduction: paced  ST Segments: nonspecific changes  T Waves: nonspecific changes  Clinical Impression: paced rhythm, No other abnormalities discernible through this rhythm    Interpretation: V Paced rhythm    RADIOLOGY:All plain film, CT, MRI, and formal ultrasound images (except ED bedside ultrasound) are read by the radiologist, see reports below, unless otherwise noted in MDM or here.  CTA HEAD NECK W CONTRAST   Final Result   1. No hemodynamically significant carotid stenosis by NASCET criteria.   2. No major arterial vessel occlusion or significant arterial stenosis in   neck or in head.   3. Small chronic appearing lacunar infarction in the lateral portion of left   cerebellar hemisphere.   4. 1.69 cm hypodense cystic appearing right thyroid nodule.  No   decompensation for follow-up thyroid imaging.   5. 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         CT HEAD WO CONTRAST   Final Result   No acute intracranial abnormality.         XR CHEST PORTABLE   Final Result   Stable cardiomegaly with pulmonary venous congestion and perihilar pulmonary   consolidation. 
ORDERS:  Orders Placed This Encounter   Procedures    XR CHEST PORTABLE     Standing Status:   Standing     Number of Occurrences:   1     Order Specific Question:   Reason for exam:     Answer:   Shortness of Breath    CTA HEAD NECK W CONTRAST     Standing Status:   Standing     Number of Occurrences:   1     Order Specific Question:   Reason for exam:     Answer:   SOB, Weak     Order Specific Question:   Has a \"code stroke\" or \"stroke alert\" been called?     Answer:   No     Order Specific Question:   Decision Support Exception - unselect if not a suspected or confirmed emergency medical condition     Answer:   Emergency Medical Condition (MA) [1]    CT HEAD WO CONTRAST     Standing Status:   Standing     Number of Occurrences:   1     Order Specific Question:   Has a \"code stroke\" or \"stroke alert\" been called?     Answer:   No     Order Specific Question:   Reason for exam:     Answer:   Short breath, weak, subtherapeutic INR     Order Specific Question:   Decision Support Exception - unselect if not a suspected or confirmed emergency medical condition     Answer:   Emergency Medical Condition (MA) [1]    BMP     Standing Status:   Standing     Number of Occurrences:   1    CBC with Auto Differential     Standing Status:   Standing     Number of Occurrences:   1    TROP/MYOGLOBIN     Standing Status:   Standing     Number of Occurrences:   1    Brain Natriuretic Peptide     Standing Status:   Standing     Number of Occurrences:   1    Protime-INR     Standing Status:   Standing     Number of Occurrences:   1    Cardiac Monitor - ED Only     Standing Status:   Standing     Number of Occurrences:   1     Order Specific Question:   Patient may go off unit without monitor     Answer:   No    EKG 12 Lead     Standing Status:   Standing     Number of Occurrences:   1     Order Specific Question:   Reason for Exam?     Answer:   Shortness of Breath    Saline lock IV     Standing Status:   Standing     Number of

## 2024-09-10 ENCOUNTER — HOSPITAL ENCOUNTER (OUTPATIENT)
Facility: CLINIC | Age: 84
Discharge: HOME | End: 2024-09-10
Payer: MEDICARE

## 2024-09-10 LAB
ANION GAP SERPL CALCULATED.3IONS-SCNC: 11 MMOL/L (ref 9–16)
BUN SERPL-MCNC: 33 MG/DL (ref 8–23)
CALCIUM SERPL-MCNC: 9.5 MG/DL (ref 8.6–10.4)
CHLORIDE SERPL-SCNC: 103 MMOL/L (ref 98–107)
CO2 SERPL-SCNC: 25 MMOL/L (ref 20–31)
CREAT SERPL-MCNC: 1.6 MG/DL (ref 0.5–0.9)
GFR, ESTIMATED: 32 ML/MIN/1.73M2
GLUCOSE SERPL-MCNC: 139 MG/DL (ref 74–99)
POTASSIUM SERPL-SCNC: 4.3 MMOL/L (ref 3.7–5.3)
SODIUM SERPL-SCNC: 139 MMOL/L (ref 136–145)

## 2024-09-10 PROCEDURE — 80048 BASIC METABOLIC PNL TOTAL CA: CPT

## 2024-09-10 PROCEDURE — 36415 COLL VENOUS BLD VENIPUNCTURE: CPT

## 2024-09-26 ENCOUNTER — HOSPITAL ENCOUNTER (OUTPATIENT)
Facility: CLINIC | Age: 84
Discharge: HOME | End: 2024-09-26
Payer: MEDICARE

## 2024-09-26 LAB
ANION GAP SERPL CALCULATED.3IONS-SCNC: 8 MMOL/L (ref 9–16)
BNP SERPL-MCNC: 1977 PG/ML (ref 0–300)
BUN SERPL-MCNC: 16 MG/DL (ref 8–23)
CALCIUM SERPL-MCNC: 9.4 MG/DL (ref 8.6–10.4)
CHLORIDE SERPL-SCNC: 106 MMOL/L (ref 98–107)
CO2 SERPL-SCNC: 27 MMOL/L (ref 20–31)
CREAT SERPL-MCNC: 1 MG/DL (ref 0.5–0.9)
GFR, ESTIMATED: 56 ML/MIN/1.73M2
GLUCOSE SERPL-MCNC: 140 MG/DL (ref 74–99)
POTASSIUM SERPL-SCNC: 4.6 MMOL/L (ref 3.7–5.3)
SODIUM SERPL-SCNC: 141 MMOL/L (ref 136–145)

## 2024-09-26 PROCEDURE — 36415 COLL VENOUS BLD VENIPUNCTURE: CPT

## 2024-09-26 PROCEDURE — 80048 BASIC METABOLIC PNL TOTAL CA: CPT

## 2024-09-26 PROCEDURE — 80162 ASSAY OF DIGOXIN TOTAL: CPT

## 2024-09-26 PROCEDURE — 83880 ASSAY OF NATRIURETIC PEPTIDE: CPT

## 2024-09-27 LAB
DATE LAST DOSE: NORMAL
DIGOXIN DOSE TIME: NORMAL
DIGOXIN DOSE: NORMAL MG
DIGOXIN SERPL-MCNC: 0.8 NG/ML (ref 0.8–2)

## 2024-10-02 RX ORDER — TRAMADOL HYDROCHLORIDE 50 MG/1
TABLET ORAL
Refills: 0 | OUTPATIENT
Start: 2024-10-02

## 2024-10-02 RX ORDER — OMEPRAZOLE 40 MG/1
40 CAPSULE, DELAYED RELEASE ORAL DAILY
Qty: 90 CAPSULE | Refills: 3 | OUTPATIENT
Start: 2024-10-02

## 2024-10-02 NOTE — TELEPHONE ENCOUNTER
Dejah Schreiber is calling to request a refill on the following medication(s):    Last Visit Date (If Applicable):  8/27/2024    Next Visit Date:    12/10/2024    Medication Request:  Requested Prescriptions     Pending Prescriptions Disp Refills    omeprazole (PRILOSEC) 40 MG delayed release capsule 90 capsule 3     Sig: Take 1 capsule by mouth daily    traMADol (ULTRAM) 50 MG tablet  0

## 2024-10-02 NOTE — TELEPHONE ENCOUNTER
She no longer sees her Cardiologist who was prescribing this med for her and the Tramadol is for her back painomeprazole

## 2024-10-08 ENCOUNTER — TELEPHONE (OUTPATIENT)
Dept: FAMILY MEDICINE CLINIC | Age: 84
End: 2024-10-08

## 2024-10-08 NOTE — TELEPHONE ENCOUNTER
Patient is having issues with transportation and is unable to keep appt today at 10:15, would like to know if medication can be sent to pharmacy with help with back and leg pin related to arthritis

## 2024-10-11 ENCOUNTER — OFFICE VISIT (OUTPATIENT)
Dept: FAMILY MEDICINE CLINIC | Age: 84
End: 2024-10-11

## 2024-10-11 VITALS
HEART RATE: 71 BPM | OXYGEN SATURATION: 97 % | WEIGHT: 170.8 LBS | DIASTOLIC BLOOD PRESSURE: 80 MMHG | BODY MASS INDEX: 33.53 KG/M2 | TEMPERATURE: 97.2 F | HEIGHT: 60 IN | SYSTOLIC BLOOD PRESSURE: 134 MMHG

## 2024-10-11 DIAGNOSIS — M47.819 MULTILEVEL SPONDYLOSIS: ICD-10-CM

## 2024-10-11 DIAGNOSIS — I25.10 CORONARY ARTERY DISEASE INVOLVING NATIVE CORONARY ARTERY OF NATIVE HEART WITHOUT ANGINA PECTORIS: ICD-10-CM

## 2024-10-11 DIAGNOSIS — Z23 FLU VACCINE NEED: Primary | ICD-10-CM

## 2024-10-11 DIAGNOSIS — I10 ESSENTIAL HYPERTENSION: ICD-10-CM

## 2024-10-11 DIAGNOSIS — E11.69 TYPE 2 DIABETES MELLITUS WITH OTHER SPECIFIED COMPLICATION, WITHOUT LONG-TERM CURRENT USE OF INSULIN (HCC): ICD-10-CM

## 2024-10-11 DIAGNOSIS — T45.515A WARFARIN-INDUCED COAGULOPATHY (HCC): ICD-10-CM

## 2024-10-11 DIAGNOSIS — K21.9 GASTROESOPHAGEAL REFLUX DISEASE, UNSPECIFIED WHETHER ESOPHAGITIS PRESENT: ICD-10-CM

## 2024-10-11 DIAGNOSIS — G89.29 GROIN PAIN, CHRONIC, RIGHT: ICD-10-CM

## 2024-10-11 DIAGNOSIS — G89.29 CHRONIC RIGHT-SIDED LOW BACK PAIN, UNSPECIFIED WHETHER SCIATICA PRESENT: ICD-10-CM

## 2024-10-11 DIAGNOSIS — I50.9 CONGESTIVE HEART FAILURE, UNSPECIFIED HF CHRONICITY, UNSPECIFIED HEART FAILURE TYPE (HCC): ICD-10-CM

## 2024-10-11 DIAGNOSIS — D68.32 WARFARIN-INDUCED COAGULOPATHY (HCC): ICD-10-CM

## 2024-10-11 DIAGNOSIS — R78.89 SERUM DIGOXIN LEVEL WITHIN THERAPEUTIC RANGE: ICD-10-CM

## 2024-10-11 DIAGNOSIS — R10.31 RIGHT LOWER QUADRANT ABDOMINAL PAIN: ICD-10-CM

## 2024-10-11 DIAGNOSIS — E78.5 DYSLIPIDEMIA: ICD-10-CM

## 2024-10-11 DIAGNOSIS — M54.50 CHRONIC RIGHT-SIDED LOW BACK PAIN, UNSPECIFIED WHETHER SCIATICA PRESENT: ICD-10-CM

## 2024-10-11 DIAGNOSIS — I48.20 CHRONIC ATRIAL FIBRILLATION (HCC): ICD-10-CM

## 2024-10-11 DIAGNOSIS — R10.31 GROIN PAIN, CHRONIC, RIGHT: ICD-10-CM

## 2024-10-11 DIAGNOSIS — E79.0 HYPERURICEMIA: ICD-10-CM

## 2024-10-11 DIAGNOSIS — M81.0 AGE-RELATED OSTEOPOROSIS WITHOUT CURRENT PATHOLOGICAL FRACTURE: ICD-10-CM

## 2024-10-11 PROBLEM — I50.32 HEART FAILURE WITH IMPROVED EJECTION FRACTION (HFIMPEF) (HCC): Status: ACTIVE | Noted: 2023-06-08

## 2024-10-11 ASSESSMENT — PATIENT HEALTH QUESTIONNAIRE - PHQ9
SUM OF ALL RESPONSES TO PHQ QUESTIONS 1-9: 0
1. LITTLE INTEREST OR PLEASURE IN DOING THINGS: NOT AT ALL
2. FEELING DOWN, DEPRESSED OR HOPELESS: NOT AT ALL
SUM OF ALL RESPONSES TO PHQ QUESTIONS 1-9: 0
SUM OF ALL RESPONSES TO PHQ9 QUESTIONS 1 & 2: 0
2. FEELING DOWN, DEPRESSED OR HOPELESS: NOT AT ALL
SUM OF ALL RESPONSES TO PHQ QUESTIONS 1-9: 0
1. LITTLE INTEREST OR PLEASURE IN DOING THINGS: NOT AT ALL
SUM OF ALL RESPONSES TO PHQ QUESTIONS 1-9: 0
SUM OF ALL RESPONSES TO PHQ9 QUESTIONS 1 & 2: 0
SUM OF ALL RESPONSES TO PHQ QUESTIONS 1-9: 0

## 2024-10-11 ASSESSMENT — ANXIETY QUESTIONNAIRES
IF YOU CHECKED OFF ANY PROBLEMS ON THIS QUESTIONNAIRE, HOW DIFFICULT HAVE THESE PROBLEMS MADE IT FOR YOU TO DO YOUR WORK, TAKE CARE OF THINGS AT HOME, OR GET ALONG WITH OTHER PEOPLE: NOT DIFFICULT AT ALL
7. FEELING AFRAID AS IF SOMETHING AWFUL MIGHT HAPPEN: NOT AT ALL
5. BEING SO RESTLESS THAT IT IS HARD TO SIT STILL: NOT AT ALL
6. BECOMING EASILY ANNOYED OR IRRITABLE: NOT AT ALL
GAD7 TOTAL SCORE: 0
1. FEELING NERVOUS, ANXIOUS, OR ON EDGE: NOT AT ALL
2. NOT BEING ABLE TO STOP OR CONTROL WORRYING: NOT AT ALL
4. TROUBLE RELAXING: NOT AT ALL
3. WORRYING TOO MUCH ABOUT DIFFERENT THINGS: NOT AT ALL

## 2024-10-11 ASSESSMENT — ENCOUNTER SYMPTOMS
DIARRHEA: 0
STRIDOR: 0
CONSTIPATION: 0
ABDOMINAL PAIN: 0
VOMITING: 0
BACK PAIN: 1
SHORTNESS OF BREATH: 0
EYE REDNESS: 0
BLOOD IN STOOL: 0
SORE THROAT: 0
COUGH: 0
EYE DISCHARGE: 0
NAUSEA: 0
PHOTOPHOBIA: 0
EYE PAIN: 0

## 2024-10-11 NOTE — PROGRESS NOTES
Vaccine Information Sheet, \"Influenza - Inactivated\"  given to Dejah Schreiber, or parent/legal guardian of  Dejah Schreiber and verbalized understanding.    Patient responses:    Have you ever had a reaction to a flu vaccine? No  Are you able to eat eggs without adverse effects?  Yes  Do you have any current illness?  No  Have you ever had [place] Syndrome?  No    Flu vaccine given per order. Please see immunization tab.

## 2024-10-11 NOTE — PATIENT INSTRUCTIONS
Thank you for choosing [facility].  We know you have options when it comes to your healthcare; we appreciate that you chose us. Our goal is to provide exceptional  service and world class care to every patient.  You will be receiving a survey via email or text message asking for your feedback.  Please take a few minutes to share your thoughts about your recent visit. Your comments helps us understand what we do well and ways we can improve.  Thank you in advance for your valuable feedback.

## 2024-10-11 NOTE — PROGRESS NOTES
Subjective:      Patient ID: Dejah Schreiber is a 84 y.o. female.  States has been having pain in RLQ and right hip and groin area x a while- not better.   Seen Dr Lam rheumatologist 2-3 years ago x 1 - saw him about her hands that was numb.  State snot seen an ortho for  the same. Was told years ago she had arthritis.  Review of Systems   Constitutional:  Negative for chills and fever.   HENT:  Negative for congestion, ear pain, hearing loss, nosebleeds, sore throat and tinnitus.    Eyes:  Negative for photophobia, pain, discharge and redness.   Respiratory:  Negative for cough, shortness of breath and stridor.    Cardiovascular:  Negative for chest pain, palpitations and leg swelling.   Gastrointestinal:  Negative for abdominal pain, blood in stool, constipation, diarrhea, nausea and vomiting.   Endocrine: Negative for polydipsia.   Genitourinary:  Negative for dysuria, flank pain, frequency, hematuria and urgency.   Musculoskeletal:  Positive for arthralgias, back pain, gait problem and myalgias. Negative for neck pain.   Skin:  Negative for rash.   Allergic/Immunologic: Negative for environmental allergies.   Neurological:  Negative for dizziness, tremors, seizures, weakness and headaches.   Hematological:  Does not bruise/bleed easily.   Psychiatric/Behavioral:  Negative for hallucinations and suicidal ideas. The patient is not nervous/anxious.      Objective:   Physical Exam  Constitutional:       General: She is not in acute distress.     Appearance: She is well-developed.   HENT:      Head: Normocephalic and atraumatic.      Right Ear: External ear normal.      Left Ear: External ear normal.      Nose: Nose normal.      Mouth/Throat:      Mouth: Mucous membranes are moist.   Eyes:      Conjunctiva/sclera: Conjunctivae normal.      Pupils: Pupils are equal, round, and reactive to light.   Cardiovascular:      Rate and Rhythm: Normal rate and regular rhythm.      Heart sounds: Normal heart sounds.   Pulmonary:

## 2024-10-11 NOTE — PROGRESS NOTES
\"Have you been to the ER, urgent care clinic since your last visit?  Hospitalized since your last visit?\"    NO    “Have you seen or consulted any other health care providers outside our system since your last visit?”    YES - When: approximately 2  weeks ago.  Where and Why:  .

## 2024-10-12 ENCOUNTER — HOSPITAL ENCOUNTER (OUTPATIENT)
Facility: CLINIC | Age: 84
Discharge: HOME | End: 2024-10-12
Payer: MEDICARE

## 2024-10-12 LAB
ANION GAP SERPL CALCULATED.3IONS-SCNC: 8 MMOL/L (ref 9–16)
BNP SERPL-MCNC: 1358 PG/ML (ref 0–300)
BUN SERPL-MCNC: 15 MG/DL (ref 8–23)
CALCIUM SERPL-MCNC: 9.6 MG/DL (ref 8.6–10.4)
CHLORIDE SERPL-SCNC: 101 MMOL/L (ref 98–107)
CO2 SERPL-SCNC: 26 MMOL/L (ref 20–31)
CREAT SERPL-MCNC: 1.1 MG/DL (ref 0.5–0.9)
GFR, ESTIMATED: 47 ML/MIN/1.73M2
GLUCOSE SERPL-MCNC: 105 MG/DL (ref 74–99)
INR PPP: 3.7
POTASSIUM SERPL-SCNC: 4.4 MMOL/L (ref 3.7–5.3)
PROTHROMBIN TIME: 37.8 SEC (ref 9.4–12.6)
SODIUM SERPL-SCNC: 135 MMOL/L (ref 136–145)

## 2024-10-12 PROCEDURE — 36415 COLL VENOUS BLD VENIPUNCTURE: CPT

## 2024-10-12 PROCEDURE — 83880 ASSAY OF NATRIURETIC PEPTIDE: CPT

## 2024-10-12 PROCEDURE — 85610 PROTHROMBIN TIME: CPT

## 2024-10-12 PROCEDURE — 80048 BASIC METABOLIC PNL TOTAL CA: CPT

## 2024-10-18 ENCOUNTER — TELEPHONE (OUTPATIENT)
Dept: INFUSION THERAPY | Facility: MEDICAL CENTER | Age: 84
End: 2024-10-18

## 2024-10-18 NOTE — TELEPHONE ENCOUNTER
New Order 10/11/24  Bailey Albert MD   Prolia 60 mg SQ every 6 months  Labs : serum calcium level before each dose  Order noted & chart to front office for processing

## 2024-10-19 ENCOUNTER — HOSPITAL ENCOUNTER (OUTPATIENT)
Facility: CLINIC | Age: 84
Discharge: HOME | End: 2024-10-19
Payer: MEDICARE

## 2024-10-19 LAB
INR PPP: 2.8
PROTHROMBIN TIME: 28.6 SEC (ref 9.4–12.6)

## 2024-10-19 PROCEDURE — 36415 COLL VENOUS BLD VENIPUNCTURE: CPT

## 2024-10-19 PROCEDURE — 85610 PROTHROMBIN TIME: CPT

## 2024-10-25 ENCOUNTER — HOSPITAL ENCOUNTER (OUTPATIENT)
Facility: CLINIC | Age: 84
Discharge: HOME | End: 2024-10-25
Payer: MEDICARE

## 2024-10-25 DIAGNOSIS — E11.69 TYPE 2 DIABETES MELLITUS WITH OTHER SPECIFIED COMPLICATION, WITHOUT LONG-TERM CURRENT USE OF INSULIN (HCC): ICD-10-CM

## 2024-10-25 LAB
ANION GAP SERPL CALCULATED.3IONS-SCNC: 11 MMOL/L (ref 9–16)
BUN SERPL-MCNC: 15 MG/DL (ref 8–23)
CALCIUM SERPL-MCNC: 9.7 MG/DL (ref 8.6–10.4)
CHLORIDE SERPL-SCNC: 105 MMOL/L (ref 98–107)
CO2 SERPL-SCNC: 24 MMOL/L (ref 20–31)
CREAT SERPL-MCNC: 0.9 MG/DL (ref 0.5–0.9)
CREAT UR-MCNC: 90.8 MG/DL (ref 28–217)
EST. AVERAGE GLUCOSE BLD GHB EST-MCNC: 143 MG/DL
GFR, ESTIMATED: 59 ML/MIN/1.73M2
GLUCOSE SERPL-MCNC: 111 MG/DL (ref 74–99)
HBA1C MFR BLD: 6.6 % (ref 4–6)
INSULIN COMMENT: NORMAL
INSULIN REFERENCE RANGE:: NORMAL
INSULIN: 11 MU/L
MICROALBUMIN UR-MCNC: 46 MG/L (ref 0–20)
MICROALBUMIN/CREAT UR-RTO: 51 MCG/MG CREAT (ref 0–25)
POTASSIUM SERPL-SCNC: 4.4 MMOL/L (ref 3.7–5.3)
SODIUM SERPL-SCNC: 140 MMOL/L (ref 136–145)

## 2024-10-25 PROCEDURE — 80048 BASIC METABOLIC PNL TOTAL CA: CPT

## 2024-10-25 PROCEDURE — 82043 UR ALBUMIN QUANTITATIVE: CPT

## 2024-10-25 PROCEDURE — 36415 COLL VENOUS BLD VENIPUNCTURE: CPT

## 2024-10-25 PROCEDURE — 83036 HEMOGLOBIN GLYCOSYLATED A1C: CPT

## 2024-10-25 PROCEDURE — 82570 ASSAY OF URINE CREATININE: CPT

## 2024-10-25 PROCEDURE — 83525 ASSAY OF INSULIN: CPT

## 2024-10-31 ENCOUNTER — TELEPHONE (OUTPATIENT)
Dept: ONCOLOGY | Age: 84
End: 2024-10-31

## 2024-10-31 NOTE — TELEPHONE ENCOUNTER
I TRIED TO CALL [facility] TO SCHEDULE HER PROLIA AND LAB WORK AND HAD TO LEAVE A MESSAGE TO CALL THE OFFICE TO SCHEDULE.

## 2024-11-01 ENCOUNTER — OFFICE VISIT (OUTPATIENT)
Dept: FAMILY MEDICINE CLINIC | Age: 84
End: 2024-11-01

## 2024-11-01 VITALS
WEIGHT: 172 LBS | BODY MASS INDEX: 33.77 KG/M2 | OXYGEN SATURATION: 96 % | DIASTOLIC BLOOD PRESSURE: 90 MMHG | HEART RATE: 70 BPM | TEMPERATURE: 96.8 F | SYSTOLIC BLOOD PRESSURE: 130 MMHG | HEIGHT: 60 IN

## 2024-11-01 DIAGNOSIS — M81.0 AGE-RELATED OSTEOPOROSIS WITHOUT CURRENT PATHOLOGICAL FRACTURE: Primary | ICD-10-CM

## 2024-11-01 DIAGNOSIS — R29.898 HAND WEAKNESS: ICD-10-CM

## 2024-11-01 DIAGNOSIS — Z79.01 ANTICOAGULANT LONG-TERM USE: ICD-10-CM

## 2024-11-01 DIAGNOSIS — D68.32 WARFARIN-INDUCED COAGULOPATHY (HCC): ICD-10-CM

## 2024-11-01 DIAGNOSIS — M47.819 MULTILEVEL SPONDYLOSIS: ICD-10-CM

## 2024-11-01 DIAGNOSIS — R80.9 MICROALBUMINURIA: ICD-10-CM

## 2024-11-01 DIAGNOSIS — I48.20 CHRONIC ATRIAL FIBRILLATION (HCC): ICD-10-CM

## 2024-11-01 DIAGNOSIS — I25.10 CORONARY ARTERY DISEASE INVOLVING NATIVE CORONARY ARTERY OF NATIVE HEART WITHOUT ANGINA PECTORIS: ICD-10-CM

## 2024-11-01 DIAGNOSIS — E78.5 DYSLIPIDEMIA: ICD-10-CM

## 2024-11-01 DIAGNOSIS — M79.641 PAIN OF RIGHT HAND: ICD-10-CM

## 2024-11-01 DIAGNOSIS — I10 ESSENTIAL HYPERTENSION: ICD-10-CM

## 2024-11-01 DIAGNOSIS — N18.31 STAGE 3A CHRONIC KIDNEY DISEASE (HCC): ICD-10-CM

## 2024-11-01 DIAGNOSIS — R10.31 GROIN PAIN, CHRONIC, RIGHT: ICD-10-CM

## 2024-11-01 DIAGNOSIS — E11.69 TYPE 2 DIABETES MELLITUS WITH OTHER SPECIFIED COMPLICATION, WITHOUT LONG-TERM CURRENT USE OF INSULIN (HCC): ICD-10-CM

## 2024-11-01 DIAGNOSIS — Z87.19 HISTORY OF GI BLEED: ICD-10-CM

## 2024-11-01 DIAGNOSIS — T45.515A WARFARIN-INDUCED COAGULOPATHY (HCC): ICD-10-CM

## 2024-11-01 DIAGNOSIS — G89.29 GROIN PAIN, CHRONIC, RIGHT: ICD-10-CM

## 2024-11-01 ASSESSMENT — ENCOUNTER SYMPTOMS
NAUSEA: 0
COUGH: 0
STRIDOR: 0
ABDOMINAL PAIN: 0
BACK PAIN: 0
SHORTNESS OF BREATH: 0
EYE DISCHARGE: 0
VOMITING: 0
EYE REDNESS: 0
EYE PAIN: 0
DIARRHEA: 0
PHOTOPHOBIA: 0
CONSTIPATION: 0
SORE THROAT: 0
BLOOD IN STOOL: 0

## 2024-11-01 ASSESSMENT — PATIENT HEALTH QUESTIONNAIRE - PHQ9
SUM OF ALL RESPONSES TO PHQ QUESTIONS 1-9: 0
1. LITTLE INTEREST OR PLEASURE IN DOING THINGS: NOT AT ALL
SUM OF ALL RESPONSES TO PHQ9 QUESTIONS 1 & 2: 0
SUM OF ALL RESPONSES TO PHQ QUESTIONS 1-9: 0
1. LITTLE INTEREST OR PLEASURE IN DOING THINGS: NOT AT ALL
SUM OF ALL RESPONSES TO PHQ QUESTIONS 1-9: 0
2. FEELING DOWN, DEPRESSED OR HOPELESS: NOT AT ALL
2. FEELING DOWN, DEPRESSED OR HOPELESS: NOT AT ALL
SUM OF ALL RESPONSES TO PHQ QUESTIONS 1-9: 0
SUM OF ALL RESPONSES TO PHQ9 QUESTIONS 1 & 2: 0
SUM OF ALL RESPONSES TO PHQ QUESTIONS 1-9: 0

## 2024-11-01 ASSESSMENT — ANXIETY QUESTIONNAIRES
IF YOU CHECKED OFF ANY PROBLEMS ON THIS QUESTIONNAIRE, HOW DIFFICULT HAVE THESE PROBLEMS MADE IT FOR YOU TO DO YOUR WORK, TAKE CARE OF THINGS AT HOME, OR GET ALONG WITH OTHER PEOPLE: NOT DIFFICULT AT ALL
7. FEELING AFRAID AS IF SOMETHING AWFUL MIGHT HAPPEN: NOT AT ALL
6. BECOMING EASILY ANNOYED OR IRRITABLE: NOT AT ALL
GAD7 TOTAL SCORE: 0
3. WORRYING TOO MUCH ABOUT DIFFERENT THINGS: NOT AT ALL
1. FEELING NERVOUS, ANXIOUS, OR ON EDGE: NOT AT ALL
2. NOT BEING ABLE TO STOP OR CONTROL WORRYING: NOT AT ALL
5. BEING SO RESTLESS THAT IT IS HARD TO SIT STILL: NOT AT ALL
4. TROUBLE RELAXING: NOT AT ALL

## 2024-11-01 NOTE — PROGRESS NOTES
Subjective:      Patient ID: Dejah Schreiber is a 84 y.o. female.    States right groin sx and back pain chronic - seeing Dr Joseph for this next week.   States RMF is numb and in winter Both hands hurt a lot as well.  States does not have  weakness- states using the left hand more as right hand is numb.  Mood and sleep ok. Bowels and bladder ok. States had diabetic labs- reviewed with patient- microalbuminria,  Mild CKD, HbA1C was 6.6.  Follows up with [facility]- monitors BP , LA and weight.  BP has been high for few weeks- Grandson moved in a couple of week sago- went up then, moved out yesterday.  Yvette called for prolia shot- she needs to call and set up appt.  Referred to Dr Salcedo for her right hand numbness.  Review of Systems   Constitutional:  Negative for chills and fever.   HENT:  Negative for congestion, ear pain, hearing loss, nosebleeds, sore throat and tinnitus.    Eyes:  Negative for photophobia, pain, discharge and redness.   Respiratory:  Negative for cough, shortness of breath and stridor.    Cardiovascular:  Negative for chest pain, palpitations and leg swelling.   Gastrointestinal:  Negative for abdominal pain, blood in stool, constipation, diarrhea, nausea and vomiting.   Endocrine: Negative for polydipsia.   Genitourinary:  Negative for dysuria, flank pain, frequency, hematuria and urgency.   Musculoskeletal:  Negative for back pain, myalgias and neck pain.   Skin:  Negative for rash.   Allergic/Immunologic: Negative for environmental allergies.   Neurological:  Negative for dizziness, tremors, seizures, weakness and headaches.   Hematological:  Does not bruise/bleed easily.   Psychiatric/Behavioral:  Negative for hallucinations and suicidal ideas. The patient is not nervous/anxious.        Objective:   Physical Exam  Constitutional:       General: She is not in acute distress.     Appearance: She is well-developed.   HENT:      Head: Normocephalic and atraumatic.      Right Ear: External

## 2024-11-03 ENCOUNTER — HOSPITAL ENCOUNTER (OUTPATIENT)
Facility: CLINIC | Age: 84
Discharge: HOME | End: 2024-11-03
Payer: MEDICARE

## 2024-11-03 LAB
INR PPP: 2.1
PROTHROMBIN TIME: 21.7 SEC (ref 9.4–12.6)

## 2024-11-03 PROCEDURE — 36415 COLL VENOUS BLD VENIPUNCTURE: CPT

## 2024-11-03 PROCEDURE — 85610 PROTHROMBIN TIME: CPT

## 2024-11-07 ENCOUNTER — OFFICE VISIT (OUTPATIENT)
Dept: ORTHOPEDIC SURGERY | Age: 84
End: 2024-11-07

## 2024-11-07 DIAGNOSIS — M54.50 CHRONIC MIDLINE LOW BACK PAIN WITHOUT SCIATICA: Primary | ICD-10-CM

## 2024-11-07 DIAGNOSIS — M43.10 ACQUIRED SPONDYLOLISTHESIS: ICD-10-CM

## 2024-11-07 DIAGNOSIS — G89.29 CHRONIC MIDLINE LOW BACK PAIN WITHOUT SCIATICA: Primary | ICD-10-CM

## 2024-11-07 NOTE — PROGRESS NOTES
(8/27/2024)    Exercise Vital Sign     Days of Exercise per Week: 0 days     Minutes of Exercise per Session: 0 min   Stress: Not on file   Social Connections: Not on file   Intimate Partner Violence: Unknown (2/22/2024)    Received from [facility]     Fear of Current or Ex-Partner: Not on file     Emotionally Abused: Not on file     Physically Abused: Not on file     Sexually Abused: Not on file     Physically or Sexually Abused: Not on file   Housing Stability: Unknown (7/24/2024)    Housing Stability Vital Sign     Unable to Pay for Housing in the Last Year: Not on file     Number of Places Lived in the Last Year: Not on file     Unstable Housing in the Last Year: No     Past Medical History:   Diagnosis Date    CHF (congestive heart failure) (HCC)      Past Surgical History:   Procedure Laterality Date    APPENDECTOMY      BLADDER REPAIR      PACEMAKER INSERTION      PARTIAL HYSTERECTOMY (CERVIX NOT REMOVED)      TONSILLECTOMY      VARIOCOCELE REPAIR       No family history on file.     Physical Exam:  Vitals signs and nursing note reviewed.   Constitutional:       Appearance: well-developed.   HENT:      Head: Normocephalic and atraumatic.      Nose: Nose normal.   Eyes:      Conjunctiva/sclera: Conjunctivae normal.   Neck:      Musculoskeletal: Normal range of motion and neck supple.   Pulmonary:      Effort: Pulmonary effort is normal. No respiratory distress.   Musculoskeletal:      Comments: Normal gait     Skin:     General: Skin is warm and dry.   Neurological:      Mental Status: Alert and oriented to person, place, and time.      Sensory: No sensory deficit.   Psychiatric:         Behavior: Behavior normal.         Thought Content: Thought content normal.    Patient with no pain with range of motion left hip    Provider Attestation:  Miguel A GOLDEN, personally performed the services described in this documentation. All medical record

## 2024-11-08 NOTE — TELEPHONE ENCOUNTER
I TRIED TO CALL [facility] TO SCHEDULE HER PROLIA INJECTION & LAB WORK AND HAD TO LEAVE A MESSAGE TO CALL THE OFFICE TO SCHEDULE.

## 2024-11-11 NOTE — TELEPHONE ENCOUNTER
I TRIED TO CALL [facility] TO SCHEDULE HER PROLIA & LAB WORK AND  HAD TO LEAVE A VM TO CALL THE OFFICE TO SCHEDULE.

## 2024-11-15 ENCOUNTER — HOSPITAL ENCOUNTER (OUTPATIENT)
Facility: CLINIC | Age: 84
Discharge: HOME | End: 2024-11-15
Payer: MEDICARE

## 2024-11-15 LAB
ANION GAP SERPL CALCULATED.3IONS-SCNC: 11 MMOL/L (ref 9–16)
BUN SERPL-MCNC: 22 MG/DL (ref 8–23)
CALCIUM SERPL-MCNC: 9.6 MG/DL (ref 8.6–10.4)
CHLORIDE SERPL-SCNC: 105 MMOL/L (ref 98–107)
CO2 SERPL-SCNC: 24 MMOL/L (ref 20–31)
CREAT SERPL-MCNC: 1.1 MG/DL (ref 0.6–0.9)
GFR, ESTIMATED: 49 ML/MIN/1.73M2
GLUCOSE SERPL-MCNC: 134 MG/DL (ref 74–99)
POTASSIUM SERPL-SCNC: 4.7 MMOL/L (ref 3.7–5.3)
SODIUM SERPL-SCNC: 140 MMOL/L (ref 136–145)

## 2024-11-15 PROCEDURE — 36415 COLL VENOUS BLD VENIPUNCTURE: CPT

## 2024-11-15 PROCEDURE — 80048 BASIC METABOLIC PNL TOTAL CA: CPT

## 2024-11-18 ENCOUNTER — TELEPHONE (OUTPATIENT)
Dept: INTERVENTIONAL RADIOLOGY/VASCULAR | Age: 84
End: 2024-11-18

## 2024-12-06 ENCOUNTER — HOSPITAL ENCOUNTER (OUTPATIENT)
Facility: CLINIC | Age: 84
Discharge: HOME | End: 2024-12-06
Payer: MEDICARE

## 2024-12-06 LAB
INR PPP: 2.9
PROTHROMBIN TIME: 29.1 SEC (ref 9.4–12.6)

## 2024-12-06 PROCEDURE — 36415 COLL VENOUS BLD VENIPUNCTURE: CPT

## 2024-12-06 PROCEDURE — 85610 PROTHROMBIN TIME: CPT

## 2024-12-24 ENCOUNTER — TELEPHONE (OUTPATIENT)
Dept: INTERVENTIONAL RADIOLOGY/VASCULAR | Age: 84
End: 2024-12-24

## 2024-12-24 NOTE — TELEPHONE ENCOUNTER
Attempted to schedule pt for myelogram. Pt did not want to schedule at this time due holiday. Call back number given.

## 2025-01-08 ENCOUNTER — OFFICE VISIT (OUTPATIENT)
Dept: FAMILY MEDICINE CLINIC | Age: 85
End: 2025-01-08
Payer: MEDICARE

## 2025-01-08 VITALS
SYSTOLIC BLOOD PRESSURE: 120 MMHG | OXYGEN SATURATION: 96 % | BODY MASS INDEX: 32.35 KG/M2 | WEIGHT: 164.8 LBS | HEART RATE: 80 BPM | HEIGHT: 60 IN | DIASTOLIC BLOOD PRESSURE: 80 MMHG | TEMPERATURE: 97.1 F

## 2025-01-08 DIAGNOSIS — N89.8 VAGINAL DRYNESS: Primary | ICD-10-CM

## 2025-01-08 DIAGNOSIS — N89.5: ICD-10-CM

## 2025-01-08 DIAGNOSIS — E66.811 CLASS 1 OBESITY WITH BODY MASS INDEX (BMI) OF 32.0 TO 32.9 IN ADULT, UNSPECIFIED OBESITY TYPE, UNSPECIFIED WHETHER SERIOUS COMORBIDITY PRESENT: ICD-10-CM

## 2025-01-08 DIAGNOSIS — L98.9 BENIGN SKIN LESION OF MULTIPLE SITES: ICD-10-CM

## 2025-01-08 PROCEDURE — 1123F ACP DISCUSS/DSCN MKR DOCD: CPT | Performed by: FAMILY MEDICINE

## 2025-01-08 PROCEDURE — 99214 OFFICE O/P EST MOD 30 MIN: CPT | Performed by: FAMILY MEDICINE

## 2025-01-08 PROCEDURE — 1159F MED LIST DOCD IN RCRD: CPT | Performed by: FAMILY MEDICINE

## 2025-01-08 PROCEDURE — 1160F RVW MEDS BY RX/DR IN RCRD: CPT | Performed by: FAMILY MEDICINE

## 2025-01-08 SDOH — ECONOMIC STABILITY: FOOD INSECURITY: WITHIN THE PAST 12 MONTHS, THE FOOD YOU BOUGHT JUST DIDN'T LAST AND YOU DIDN'T HAVE MONEY TO GET MORE.: NEVER TRUE

## 2025-01-08 SDOH — ECONOMIC STABILITY: FOOD INSECURITY: WITHIN THE PAST 12 MONTHS, YOU WORRIED THAT YOUR FOOD WOULD RUN OUT BEFORE YOU GOT MONEY TO BUY MORE.: NEVER TRUE

## 2025-01-08 ASSESSMENT — ANXIETY QUESTIONNAIRES
2. NOT BEING ABLE TO STOP OR CONTROL WORRYING: NOT AT ALL
3. WORRYING TOO MUCH ABOUT DIFFERENT THINGS: NOT AT ALL
6. BECOMING EASILY ANNOYED OR IRRITABLE: NOT AT ALL
GAD7 TOTAL SCORE: 0
1. FEELING NERVOUS, ANXIOUS, OR ON EDGE: NOT AT ALL
5. BEING SO RESTLESS THAT IT IS HARD TO SIT STILL: NOT AT ALL
7. FEELING AFRAID AS IF SOMETHING AWFUL MIGHT HAPPEN: NOT AT ALL
4. TROUBLE RELAXING: NOT AT ALL

## 2025-01-08 ASSESSMENT — PATIENT HEALTH QUESTIONNAIRE - PHQ9
SUM OF ALL RESPONSES TO PHQ9 QUESTIONS 1 & 2: 0
SUM OF ALL RESPONSES TO PHQ QUESTIONS 1-9: 0
SUM OF ALL RESPONSES TO PHQ QUESTIONS 1-9: 0
1. LITTLE INTEREST OR PLEASURE IN DOING THINGS: NOT AT ALL
1. LITTLE INTEREST OR PLEASURE IN DOING THINGS: NOT AT ALL
SUM OF ALL RESPONSES TO PHQ QUESTIONS 1-9: 0
SUM OF ALL RESPONSES TO PHQ QUESTIONS 1-9: 0
2. FEELING DOWN, DEPRESSED OR HOPELESS: NOT AT ALL
SUM OF ALL RESPONSES TO PHQ QUESTIONS 1-9: 0
SUM OF ALL RESPONSES TO PHQ QUESTIONS 1-9: 0
SUM OF ALL RESPONSES TO PHQ9 QUESTIONS 1 & 2: 0
SUM OF ALL RESPONSES TO PHQ QUESTIONS 1-9: 0
SUM OF ALL RESPONSES TO PHQ QUESTIONS 1-9: 0
2. FEELING DOWN, DEPRESSED OR HOPELESS: NOT AT ALL

## 2025-01-08 ASSESSMENT — ENCOUNTER SYMPTOMS
ABDOMINAL PAIN: 0
VOMITING: 0
SORE THROAT: 0
EYE DISCHARGE: 0
NAUSEA: 0
COUGH: 0
EYE REDNESS: 0
BACK PAIN: 0
CONSTIPATION: 0
DIARRHEA: 0
BLOOD IN STOOL: 0
STRIDOR: 0
EYE PAIN: 0
PHOTOPHOBIA: 0
SHORTNESS OF BREATH: 0

## 2025-01-08 NOTE — PATIENT INSTRUCTIONS
Thank you for choosing [facility].  We know you have options when it comes to your healthcare; we appreciate that you chose us. Our goal is to provide exceptional  service and world class care to every patient.  You will be receiving a survey via email or text message asking for your feedback.  Please take a few minutes to share your thoughts about your recent visit. Your comments helps us understand what we do well and ways we can improve.  Thank you in advance for your valuable feedback.

## 2025-01-08 NOTE — PROGRESS NOTES
\"Have you been to the ER, urgent care clinic since your last visit?  Hospitalized since your last visit?\"    NO    “Have you seen or consulted any other health care providers outside our system since your last visit?”    YES - When: approximately 2 days ago.  Where and Why: [facility]             
are equal, round, and reactive to light.   Neck:      Thyroid: No thyromegaly.      Vascular: No JVD.      Trachea: No tracheal deviation.   Cardiovascular:      Rate and Rhythm: Normal rate and regular rhythm.      Pulses: Normal pulses.      Heart sounds: Normal heart sounds. No murmur heard.     No friction rub. No gallop.   Pulmonary:      Effort: Pulmonary effort is normal.      Breath sounds: Normal breath sounds. No stridor.   Chest:      Chest wall: No tenderness.   Abdominal:      General: Bowel sounds are normal.      Palpations: Abdomen is soft.   Musculoskeletal:         General: No tenderness or deformity.      Cervical back: Normal range of motion and neck supple.   Lymphadenopathy:      Cervical: No cervical adenopathy.   Skin:     General: Skin is warm and dry.      Findings: No erythema or rash.   Neurological:      General: No focal deficit present.      Mental Status: She is alert and oriented to person, place, and time. Mental status is at baseline.      Cranial Nerves: No cranial nerve deficit.      Motor: No abnormal muscle tone.      Coordination: Coordination normal.      Deep Tendon Reflexes: Reflexes are normal and symmetric. Reflexes normal.   Psychiatric:         Mood and Affect: Mood normal.         Behavior: Behavior normal.         Thought Content: Thought content normal.         Judgment: Judgment normal.        Vitals:    01/08/25 1650   BP: (!) 140/80   Pulse:    Temp:    SpO2:          Assessment:      Diagnosis Orders   1. Vaginal dryness  [facility]      2. 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 Benign skin lesion of multiple sites  Mindy Andrade MD, [facility]             Plan:     Orders Placed This Encounter   Procedures    [facility]    Mindy Andrade MD, [facility]       Outpatient Encounter

## 2025-02-25 ENCOUNTER — OFFICE VISIT (OUTPATIENT)
Dept: OBGYN CLINIC | Age: 85
End: 2025-02-25
Payer: MEDICARE

## 2025-02-25 VITALS
BODY MASS INDEX: 30.96 KG/M2 | DIASTOLIC BLOOD PRESSURE: 93 MMHG | HEIGHT: 61 IN | WEIGHT: 164 LBS | SYSTOLIC BLOOD PRESSURE: 180 MMHG | HEART RATE: 74 BPM

## 2025-02-25 DIAGNOSIS — N81.6 RECTOCELE: Primary | ICD-10-CM

## 2025-02-25 DIAGNOSIS — G89.29 CHRONIC RLQ PAIN: ICD-10-CM

## 2025-02-25 DIAGNOSIS — R10.31 CHRONIC RLQ PAIN: ICD-10-CM

## 2025-02-25 PROCEDURE — 99203 OFFICE O/P NEW LOW 30 MIN: CPT | Performed by: STUDENT IN AN ORGANIZED HEALTH CARE EDUCATION/TRAINING PROGRAM

## 2025-02-25 PROCEDURE — 1159F MED LIST DOCD IN RCRD: CPT | Performed by: STUDENT IN AN ORGANIZED HEALTH CARE EDUCATION/TRAINING PROGRAM

## 2025-02-25 PROCEDURE — 1123F ACP DISCUSS/DSCN MKR DOCD: CPT | Performed by: STUDENT IN AN ORGANIZED HEALTH CARE EDUCATION/TRAINING PROGRAM

## 2025-02-25 PROCEDURE — 1160F RVW MEDS BY RX/DR IN RCRD: CPT | Performed by: STUDENT IN AN ORGANIZED HEALTH CARE EDUCATION/TRAINING PROGRAM

## 2025-02-25 NOTE — PROGRESS NOTES
Dejah Schreiber  2025    YOB: 1940    HPI:  Dejah Schreiber is a 84 y.o. female    The patient was seen and examined today. She is here with her new  of 2 months. They have been attempting sex but have been unable to achieve penetration. They are concerned she might have a vaginal obstruction. They do use lube when they attempt sex. She denies any pelvic discomfort when they are attempting sex. She also mentions that she has suffered from right lower quadrant pain since summer 2024. Reports her urinary habits are normal and she has no issues with bowel movements. She is s/p hysterectomy but thinks her tubes and ovaries were left in place. She underwent her hyst when she was 30 yrs old.     OB History   No obstetric history on file.       Past Medical History:   Diagnosis Date    CHF (congestive heart failure) ([facility])        Past Surgical History:   Procedure Laterality Date    APPENDECTOMY      BLADDER REPAIR      PACEMAKER INSERTION      PARTIAL HYSTERECTOMY (CERVIX NOT REMOVED)      TONSILLECTOMY      VARIOCOCELE REPAIR         History reviewed. No pertinent family history.    Social History     Socioeconomic History    Marital status:      Spouse name: Not on file    Number of children: Not on file    Years of education: Not on file    Highest education level: Not on file   Occupational History    Not on file   Tobacco Use    Smoking status: Former     Current packs/day: 0.00     Average packs/day: 1 pack/day for 18.0 years (18.0 ttl pk-yrs)     Types: Cigarettes     Start date:      Quit date:      Years since quittin.1    Smokeless tobacco: Never   Vaping Use    Vaping status: Never Used   Substance and Sexual Activity    Alcohol use: No    Drug use: No    Sexual activity: Not on file   Other Topics Concern    Not on file   Social History Narrative    Not on file     Social Determinants of Health     Financial Resource Strain: Medium Risk (2024)    Overall